# Patient Record
Sex: MALE | Race: WHITE | NOT HISPANIC OR LATINO
[De-identification: names, ages, dates, MRNs, and addresses within clinical notes are randomized per-mention and may not be internally consistent; named-entity substitution may affect disease eponyms.]

---

## 2017-03-22 ENCOUNTER — RECORDS - HEALTHEAST (OUTPATIENT)
Dept: ADMINISTRATIVE | Facility: OTHER | Age: 32
End: 2017-03-22

## 2017-09-21 ENCOUNTER — RECORDS - HEALTHEAST (OUTPATIENT)
Dept: ADMINISTRATIVE | Facility: OTHER | Age: 32
End: 2017-09-21

## 2017-09-26 ENCOUNTER — RECORDS - HEALTHEAST (OUTPATIENT)
Dept: ADMINISTRATIVE | Facility: OTHER | Age: 32
End: 2017-09-26

## 2018-01-10 ENCOUNTER — RECORDS - HEALTHEAST (OUTPATIENT)
Dept: ADMINISTRATIVE | Facility: OTHER | Age: 33
End: 2018-01-10

## 2018-01-15 ENCOUNTER — RECORDS - HEALTHEAST (OUTPATIENT)
Dept: ADMINISTRATIVE | Facility: OTHER | Age: 33
End: 2018-01-15

## 2018-04-30 ENCOUNTER — RECORDS - HEALTHEAST (OUTPATIENT)
Dept: ADMINISTRATIVE | Facility: OTHER | Age: 33
End: 2018-04-30

## 2018-05-02 ENCOUNTER — RECORDS - HEALTHEAST (OUTPATIENT)
Dept: ADMINISTRATIVE | Facility: OTHER | Age: 33
End: 2018-05-02

## 2018-05-22 ENCOUNTER — RECORDS - HEALTHEAST (OUTPATIENT)
Dept: ADMINISTRATIVE | Facility: OTHER | Age: 33
End: 2018-05-22

## 2018-05-22 ENCOUNTER — HOSPITAL ENCOUNTER (OUTPATIENT)
Dept: MRI IMAGING | Facility: CLINIC | Age: 33
Discharge: HOME | End: 2018-05-22
Attending: INTERNAL MEDICINE

## 2018-05-22 ENCOUNTER — COMMUNICATION - HEALTHEAST (OUTPATIENT)
Dept: TELEHEALTH | Facility: CLINIC | Age: 33
End: 2018-05-22

## 2018-05-22 DIAGNOSIS — K50.90 CROHN DISEASE (H): ICD-10-CM

## 2018-07-09 ENCOUNTER — RECORDS - HEALTHEAST (OUTPATIENT)
Dept: ADMINISTRATIVE | Facility: OTHER | Age: 33
End: 2018-07-09

## 2018-09-04 ENCOUNTER — RECORDS - HEALTHEAST (OUTPATIENT)
Dept: ADMINISTRATIVE | Facility: OTHER | Age: 33
End: 2018-09-04

## 2018-10-26 ENCOUNTER — RECORDS - HEALTHEAST (OUTPATIENT)
Dept: ADMINISTRATIVE | Facility: OTHER | Age: 33
End: 2018-10-26

## 2018-12-11 ENCOUNTER — AMBULATORY - HEALTHEAST (OUTPATIENT)
Dept: LAB | Facility: HOSPITAL | Age: 33
End: 2018-12-11

## 2018-12-11 ENCOUNTER — COMMUNICATION - HEALTHEAST (OUTPATIENT)
Dept: FAMILY MEDICINE | Facility: CLINIC | Age: 33
End: 2018-12-11

## 2018-12-11 ENCOUNTER — RECORDS - HEALTHEAST (OUTPATIENT)
Dept: ADMINISTRATIVE | Facility: OTHER | Age: 33
End: 2018-12-11

## 2018-12-11 DIAGNOSIS — K50.00 CROHN'S DISEASE OF SMALL INTESTINE (H): ICD-10-CM

## 2018-12-11 LAB
ALBUMIN SERPL-MCNC: 4.1 G/DL (ref 3.5–5)
ALP SERPL-CCNC: 69 U/L (ref 45–120)
ALT SERPL W P-5'-P-CCNC: 11 U/L (ref 0–45)
ANION GAP SERPL CALCULATED.3IONS-SCNC: 7 MMOL/L (ref 5–18)
AST SERPL W P-5'-P-CCNC: 12 U/L (ref 0–40)
BILIRUB SERPL-MCNC: 0.3 MG/DL (ref 0–1)
BUN SERPL-MCNC: 9 MG/DL (ref 8–22)
CALCIUM SERPL-MCNC: 10.3 MG/DL (ref 8.5–10.5)
CHLORIDE BLD-SCNC: 104 MMOL/L (ref 98–107)
CO2 SERPL-SCNC: 31 MMOL/L (ref 22–31)
CREAT SERPL-MCNC: 0.81 MG/DL (ref 0.7–1.3)
GFR SERPL CREATININE-BSD FRML MDRD: >60 ML/MIN/1.73M2
GLUCOSE BLD-MCNC: 88 MG/DL (ref 70–125)
POTASSIUM BLD-SCNC: 5.3 MMOL/L (ref 3.5–5)
PROT SERPL-MCNC: 8.3 G/DL (ref 6–8)
SODIUM SERPL-SCNC: 142 MMOL/L (ref 136–145)

## 2018-12-14 ENCOUNTER — OFFICE VISIT - HEALTHEAST (OUTPATIENT)
Dept: FAMILY MEDICINE | Facility: CLINIC | Age: 33
End: 2018-12-14

## 2018-12-14 DIAGNOSIS — E66.3 OVERWEIGHT (BMI 25.0-29.9): ICD-10-CM

## 2018-12-14 DIAGNOSIS — K50.913 CROHN'S DISEASE WITH FISTULA, UNSPECIFIED GASTROINTESTINAL TRACT LOCATION (H): ICD-10-CM

## 2018-12-14 DIAGNOSIS — Z01.818 PREOP GENERAL PHYSICAL EXAM: ICD-10-CM

## 2018-12-14 LAB
ATRIAL RATE - MUSE: 73 BPM
CHOLEST SERPL-MCNC: 134 MG/DL
DIASTOLIC BLOOD PRESSURE - MUSE: NORMAL MMHG
ERYTHROCYTE [DISTWIDTH] IN BLOOD BY AUTOMATED COUNT: 13.6 % (ref 11–14.5)
FASTING STATUS PATIENT QL REPORTED: YES
HBA1C MFR BLD: 5.2 % (ref 3.5–6)
HCT VFR BLD AUTO: 41.9 % (ref 40–54)
HDLC SERPL-MCNC: 33 MG/DL
HGB BLD-MCNC: 13.6 G/DL (ref 14–18)
INTERPRETATION ECG - MUSE: NORMAL
LDLC SERPL CALC-MCNC: 81 MG/DL
MCH RBC QN AUTO: 26.6 PG (ref 27–34)
MCHC RBC AUTO-ENTMCNC: 32.4 G/DL (ref 32–36)
MCV RBC AUTO: 82 FL (ref 80–100)
P AXIS - MUSE: 12 DEGREES
PLATELET # BLD AUTO: 348 THOU/UL (ref 140–440)
PMV BLD AUTO: 7.2 FL (ref 7–10)
POTASSIUM BLD-SCNC: 4.3 MMOL/L (ref 3.5–5)
PR INTERVAL - MUSE: 130 MS
QRS DURATION - MUSE: 98 MS
QT - MUSE: 366 MS
QTC - MUSE: 403 MS
R AXIS - MUSE: 60 DEGREES
RBC # BLD AUTO: 5.11 MILL/UL (ref 4.4–6.2)
SYSTOLIC BLOOD PRESSURE - MUSE: NORMAL MMHG
T AXIS - MUSE: 55 DEGREES
TRIGL SERPL-MCNC: 99 MG/DL
VENTRICULAR RATE- MUSE: 73 BPM
WBC: 8.2 THOU/UL (ref 4–11)

## 2018-12-17 ENCOUNTER — COMMUNICATION - HEALTHEAST (OUTPATIENT)
Dept: INTERNAL MEDICINE | Facility: CLINIC | Age: 33
End: 2018-12-17

## 2018-12-20 ENCOUNTER — ANESTHESIA - HEALTHEAST (OUTPATIENT)
Dept: SURGERY | Facility: HOSPITAL | Age: 33
End: 2018-12-20

## 2018-12-20 ASSESSMENT — MIFFLIN-ST. JEOR: SCORE: 1880.66

## 2018-12-21 ENCOUNTER — SURGERY - HEALTHEAST (OUTPATIENT)
Dept: SURGERY | Facility: HOSPITAL | Age: 33
End: 2018-12-21

## 2018-12-21 ASSESSMENT — MIFFLIN-ST. JEOR: SCORE: 1903.34

## 2018-12-24 ASSESSMENT — MIFFLIN-ST. JEOR
SCORE: 1882.93
SCORE: 1789.03

## 2018-12-25 ENCOUNTER — COMMUNICATION - HEALTHEAST (OUTPATIENT)
Dept: PHARMACY | Facility: HOSPITAL | Age: 33
End: 2018-12-25

## 2019-01-04 ENCOUNTER — RECORDS - HEALTHEAST (OUTPATIENT)
Dept: ADMINISTRATIVE | Facility: OTHER | Age: 34
End: 2019-01-04

## 2019-01-16 ENCOUNTER — COMMUNICATION - HEALTHEAST (OUTPATIENT)
Dept: FAMILY MEDICINE | Facility: CLINIC | Age: 34
End: 2019-01-16

## 2019-01-25 ENCOUNTER — RECORDS - HEALTHEAST (OUTPATIENT)
Dept: ADMINISTRATIVE | Facility: OTHER | Age: 34
End: 2019-01-25

## 2019-04-18 ENCOUNTER — RECORDS - HEALTHEAST (OUTPATIENT)
Dept: ADMINISTRATIVE | Facility: OTHER | Age: 34
End: 2019-04-18

## 2019-04-23 ENCOUNTER — RECORDS - HEALTHEAST (OUTPATIENT)
Dept: ADMINISTRATIVE | Facility: OTHER | Age: 34
End: 2019-04-23

## 2019-04-29 ENCOUNTER — COMMUNICATION - HEALTHEAST (OUTPATIENT)
Dept: FAMILY MEDICINE | Facility: CLINIC | Age: 34
End: 2019-04-29

## 2019-05-01 ENCOUNTER — OFFICE VISIT (OUTPATIENT)
Dept: PEDIATRICS | Facility: CLINIC | Age: 34
End: 2019-05-01
Payer: COMMERCIAL

## 2019-05-01 VITALS
SYSTOLIC BLOOD PRESSURE: 110 MMHG | HEIGHT: 70 IN | TEMPERATURE: 98.5 F | HEART RATE: 83 BPM | WEIGHT: 195.4 LBS | DIASTOLIC BLOOD PRESSURE: 76 MMHG | BODY MASS INDEX: 27.97 KG/M2

## 2019-05-01 DIAGNOSIS — F41.9 ANXIETY: Primary | ICD-10-CM

## 2019-05-01 DIAGNOSIS — A04.72 CLOSTRIDIUM DIFFICILE COLITIS: ICD-10-CM

## 2019-05-01 PROBLEM — K50.90 REGIONAL ENTERITIS (H): Status: ACTIVE | Noted: 2019-05-01

## 2019-05-01 PROBLEM — K50.013: Status: ACTIVE | Noted: 2018-12-21

## 2019-05-01 PROCEDURE — 99203 OFFICE O/P NEW LOW 30 MIN: CPT | Mod: GE | Performed by: STUDENT IN AN ORGANIZED HEALTH CARE EDUCATION/TRAINING PROGRAM

## 2019-05-01 RX ORDER — VANCOMYCIN HYDROCHLORIDE 125 MG/1
125 CAPSULE ORAL 4 TIMES DAILY
Refills: 0 | COMMUNITY
Start: 2019-04-18

## 2019-05-01 RX ORDER — CITALOPRAM HYDROBROMIDE 10 MG/1
TABLET ORAL
Qty: 30 TABLET | Refills: 3 | Status: SHIPPED | OUTPATIENT
Start: 2019-05-01 | End: 2019-06-06

## 2019-05-01 SDOH — HEALTH STABILITY: MENTAL HEALTH: HOW MANY STANDARD DRINKS CONTAINING ALCOHOL DO YOU HAVE ON A TYPICAL DAY?: 1 OR 2

## 2019-05-01 SDOH — HEALTH STABILITY: MENTAL HEALTH: HOW MANY DRINKS CONTAINING ALCOHOL DO YOU HAVE ON A TYPICAL DAY WHEN YOU ARE DRINKING?: 1 OR 2

## 2019-05-01 SDOH — HEALTH STABILITY: MENTAL HEALTH: HOW OFTEN DO YOU HAVE A DRINK CONTAINING ALCOHOL?: MONTHLY OR LESS

## 2019-05-01 SDOH — HEALTH STABILITY: MENTAL HEALTH: HOW OFTEN DO YOU HAVE SIX OR MORE DRINKS ON ONE OCCASION?: NEVER

## 2019-05-01 SDOH — HEALTH STABILITY: MENTAL HEALTH: HOW OFTEN DO YOU HAVE 6 OR MORE DRINKS ON ONE OCCASION?: NEVER

## 2019-05-01 ASSESSMENT — MIFFLIN-ST. JEOR: SCORE: 1837.58

## 2019-05-01 ASSESSMENT — ANXIETY QUESTIONNAIRES
1. FEELING NERVOUS, ANXIOUS, OR ON EDGE: SEVERAL DAYS
2. NOT BEING ABLE TO STOP OR CONTROL WORRYING: SEVERAL DAYS
7. FEELING AFRAID AS IF SOMETHING AWFUL MIGHT HAPPEN: NOT AT ALL
GAD7 TOTAL SCORE: 6
4. TROUBLE RELAXING: SEVERAL DAYS
5. BEING SO RESTLESS THAT IT IS HARD TO SIT STILL: SEVERAL DAYS
IF YOU CHECKED OFF ANY PROBLEMS ON THIS QUESTIONNAIRE, HOW DIFFICULT HAVE THESE PROBLEMS MADE IT FOR YOU TO DO YOUR WORK, TAKE CARE OF THINGS AT HOME, OR GET ALONG WITH OTHER PEOPLE: SOMEWHAT DIFFICULT
6. BECOMING EASILY ANNOYED OR IRRITABLE: SEVERAL DAYS
3. WORRYING TOO MUCH ABOUT DIFFERENT THINGS: SEVERAL DAYS

## 2019-05-01 ASSESSMENT — PATIENT HEALTH QUESTIONNAIRE - PHQ9: SUM OF ALL RESPONSES TO PHQ QUESTIONS 1-9: 3

## 2019-05-01 NOTE — PATIENT INSTRUCTIONS
- will start citalopram; take 1 tablet (10 mg) daily for 2 weeks then go up to 2 tablets (20 mg) daily and stay there until follow-up   - if noticing a lot of side effects and not tolerate medication, call our clinic back  - mental health provider referral made; they will call you to make an appointment      Patient Education     Treating Anxiety Disorders with Medicine  An anxiety disorder can make you feel nervous or apprehensive, even without a clear reason. In people age 65 and older, generalized anxiety disorder is one of the most commonly diagnosed anxiety disorders. Many times it occurs with depression. Certain anxiety disorders can cause intense feelings of fear or panic. You may even have physical symptoms such as a racing heartbeat, sweating, or dizziness. If you have these feelings, you don t have to suffer anymore. Treatment to help you overcome your fears will likely include therapy (also called counseling). Medicine may also be prescribed to help control your symptoms.    Medicines  Certain medicines may be prescribed to help control your symptoms. So you may feel less anxious. You may also feel able to move forward with therapy. At first, medicines and dosages may need to be adjusted to find what works best for you. Try to be patient. Tell your healthcare provider how a medicine makes you feel. This way, you can work together to find the treatment that s best for you. Keep in mind that medicines can have side effects. Talk with your provider about any side effects that are bothering you. Changing the dose or type of medicine may help. Don t stop taking medicine on your own. That can cause symptoms to come back.    Anti-anxiety medicine. This medicine eases symptoms and helps you relax. Your healthcare provider will explain when and how to use it. It may be prescribed for use before situations that make you anxious. You may also be told to take medicine on a regular schedule. Anti-anxiety medicine may  make you feel a little sleepy or  out of it.  Don t drive a car or operate machinery while on this medicine, until you know how it affects you.  Caution  Never use alcohol or other drugs with anti-anxiety medicines. This could result in loss of muscular control, sedation, coma, or death. Also, use only the amount of medicine prescribed for you. If you think you may have taken too much, get emergency care right away.     Antidepressant medicine. This kind of medicine is often used to treat anxiety, even if you aren t depressed. An antidepressant helps balance out brain chemicals. This helps keep anxiety under control. This medicine is taken on a schedule. It takes a few weeks to start working. If you don t notice a change at first, you may just need more time. But if you don t notice results after the first few weeks, tell your provider.  Keep taking medicines as prescribed  Never change your dosage, share or use another person's medicine, or stop taking your medicines without talking to your healthcare provider first. Keep the following in mind:    Some medicines must be taken on a schedule. Make this part of your daily routine. For instance, always take your pill before brushing your teeth. A pillbox can help you remember if you ve taken your medicine each day.    Medicines are often taken for 6 to 12 months. Your healthcare provider will then evaluate whether you need to stay on them. Many people who have also had therapy may no longer need medicine to manage anxiety.    You may need to stop taking medicine slowly to give your body time to adjust. When it s time to stop, your healthcare provider will tell you more. Remember: Never stop taking your medicine without talking to your provider first.    If symptoms return, you may need to start taking medicines again. This isn t your fault. It s just the nature of your anxiety disorder.  Special concerns    Side effects. Medicines may cause side effects. Ask your  healthcare provider or pharmacist what you can expect. They may have ideas for avoiding some side effects.    Sexual problems. Some antidepressants can affect your desire for sex or your ability to have an orgasm. A change in dosage or medicine often solves the problem. If you have a sexual side effect that concerns you, tell your healthcare provider.    Addiction. If you ve never had a problem with drugs or alcohol, you may not have a problem with medicines used to treat anxiety disorders. But always discuss the medicines with your healthcare provider before taking them. If you have a history of addiction, you may not be able to use certain medicines used to treat anxiety disorders.    Medicine interactions. Always check with your pharmacist before using any over-the-counter medicines, including herbal supplements.   Date Last Reviewed: 5/1/2017 2000-2018 [employer]. [address] [place] [address], [place] [address]. All rights reserved. This information is not intended as a substitute for professional medical care. Always follow your healthcare professional's instructions.

## 2019-05-01 NOTE — PROGRESS NOTES
SUBJECTIVE:   Chencho Georges is a 33 year old male who presents to clinic today for the following   health issues:      Abnormal Mood Symptoms  Onset: on going life time- never Dx  Family Hx-  Worst once child was born 2 months     Description:   Depression: no  Anxiety: YES    Accompanying Signs & Symptoms:  Still participating in activities that you used to enjoy: YES  Fatigue: YES- un born   Irritability: YES  Difficulty concentrating: YES  Changes in appetite: no   Problems with sleep: YES  Heart racing/beating fast : YES  Thoughts of hurting yourself or others: none    History:   Recent stress: YES  Prior depression hospitalization: None  Family history of depression: no   Family history of anxiety: YES    Precipitating factors:   Alcohol/drug use: YES    Alleviating factors:  Working-out     Therapies Tried and outcome: None and work out     PHQ-9 SCORE 2019   PHQ-9 Total Score 3     NICO-7 SCORE 2019   Total Score 6       Mr. Georges is a 34 yo M w/ hx of anxiety who presents to clinic for evaluation and possibly starting medication for anxiety disorder. Has been going on for long time and found out that he has a strong FH of anxiety in maternal side. His anxiety and stress level spiked since birth of his  baby 5 months ago. He also started a new job with new training as well as ileocolic resection for crohn's disease in 2018. In addition, he is getting treated for c.diff as well. He states that it is difficult to concentrate at work, retain information. Has affected his sleeping as well. He is easily agitated and gets palpitation during anxiety. Has never tried a medication or counseling in the past. Exercises help a little bit but not much.    Rare alcohol use, former smoker, no other recreational drugs. Drinks a 20 oz bottle of mountain dew per day.    No URI symptoms but still has some diarrhea due to c.diff (still has 4 week tx of PO vancomycin left). No abdominal pain, no  "hematochezia. Has a 6-mo f/u with surgeon next week for s/p resection    Additional history: as documented    Reviewed  and updated as needed this visit by clinical staff  Tobacco  Allergies  Meds  Problems  Med Hx  Surg Hx  Fam Hx  Soc Hx          Reviewed and updated as needed this visit by Provider  Tobacco  Allergies  Meds  Problems  Med Hx  Surg Hx  Fam Hx         Patient Active Problem List   Diagnosis     Crohn's disease (H)     Crohn's disease of ileum, with fistula (H)     Clostridium difficile colitis     Anxiety     Past Surgical History:   Procedure Laterality Date     RESECTION ILEOCOLIC  12/2018       Social History     Tobacco Use     Smoking status: Former Smoker     Types: Cigarettes     Smokeless tobacco: Never Used   Substance Use Topics     Alcohol use: Yes     Frequency: Monthly or less     Drinks per session: 1 or 2     Binge frequency: Never     Comment: Rare     Family History   Problem Relation Age of Onset     Anxiety Disorder Mother      No Known Problems Father      Breast Cancer Maternal Grandmother      Cancer Maternal Grandfather      No Known Problems Paternal Grandmother      No Known Problems Paternal Grandfather      No Known Problems Sister      Anxiety Disorder Maternal Aunt          Current Outpatient Medications   Medication Sig Dispense Refill     adalimumab (HUMIRA PEN-CD/UC/HS STARTER) 40 MG/0.8ML pen kit        citalopram (CELEXA) 10 MG tablet Take 1 tablet for 2 week, then 2 tablets for 2 weeks 30 tablet 3     cholecalciferol (VITAMIN D-1000 MAX ST) 1000 units TABS Take 1,000 Units by mouth       Riboflavin (VITAMIN B-2 PO)        vancomycin (VANCOCIN) 125 MG capsule Take 125 mg by mouth 4 times daily  0     No Known Allergies    ROS:  - please refer to HPI for ROS    OBJECTIVE:   /76   Pulse 83   Temp 98.5  F (36.9  C) (Oral)   Ht 1.77 m (5' 9.69\")   Wt 88.6 kg (195 lb 6.4 oz)   BMI 28.29 kg/m    Body mass index is 28.29 kg/m .     GENERAL: " healthy, alert and no distress  EYES: Eyes grossly normal to inspection, conjunctivae and sclerae normal  HENT: Nose normal, MMM, no oral lesions, non-erythematous oropharynx  NECK: Supple, full ROM, no LAD  RESP: lungs clear to auscultation - no rales, rhonchi or wheezes  CV: regular rate and rhythm, normal S1 S2, no S3 or S4, no murmur, click or rub, no peripheral edema and peripheral pulses strong  ABDOMEN: Soft, ND/NT abdomen, BS+, well healed scar noted near umbilicus  MS: no gross musculoskeletal defects noted, no edema  NEURO: Alert, normal tone and strength, mentation intact and speech normal  PSYCH: mentation appears normal, affect normal, non-depressed mood, speech is normal, no SI or HI    Diagnostic Test Results:  none     ASSESSMENT/PLAN:   1. Anxiety  Presenting with long hx of anxiety that has worsened over the past 5 months with multiple life stressors. No depressive symptoms and no SI or HI. Exam is unremarkable. With long hx of anxiety and strong FH of anxiety, would be reasonable to start pharmacotherapy as well as counseling service. Discussed this with patient and agreed on starting citalopram. Informed pt of side effects and this medication would take ~6 weeks for full effect. Already scheduled a f/u apt to recheck how medication is doing  - citalopram (CELEXA) 10 MG tablet; Take 1 tablet for 2 week, then 2 tablets for 2 weeks  Dispense: 30 tablet; Refill: 3  - MENTAL HEALTH REFERRAL  - Adult; Outpatient Treatment; Individual/Couples/Family/Group Therapy/Health Psychology; Other: [facility] [phone]; We will contact you to schedule the appointment or please call with any questi...    2. Clostridium difficile colitis  - continue PO vancomycin as prescribed      FUTURE APPOINTMENTS:       - Follow-up visit on 6/6/19 for anxiety    Patient [facility] Dr[facility] Clemente[facility]Genevieve [facility]ello [facility]  [facility]    ----------    I discussed this case in depth with   Oumou. I reviewed and agree with the key components of the history, assessment, and plan. Interested in counseling + therapy - prior family members have done well with celexa. Follow up scheduled.     LIVIER Perea MD  Internal Medicine-Pediatrics

## 2019-05-02 ASSESSMENT — ANXIETY QUESTIONNAIRES: GAD7 TOTAL SCORE: 6

## 2019-06-06 ENCOUNTER — OFFICE VISIT (OUTPATIENT)
Dept: PEDIATRICS | Facility: CLINIC | Age: 34
End: 2019-06-06
Payer: COMMERCIAL

## 2019-06-06 VITALS
RESPIRATION RATE: 16 BRPM | SYSTOLIC BLOOD PRESSURE: 122 MMHG | BODY MASS INDEX: 26.36 KG/M2 | OXYGEN SATURATION: 100 % | HEART RATE: 90 BPM | HEIGHT: 70 IN | WEIGHT: 184.1 LBS | DIASTOLIC BLOOD PRESSURE: 78 MMHG | TEMPERATURE: 98.5 F

## 2019-06-06 DIAGNOSIS — F41.9 ANXIETY: Primary | ICD-10-CM

## 2019-06-06 PROCEDURE — 99213 OFFICE O/P EST LOW 20 MIN: CPT | Mod: GE | Performed by: STUDENT IN AN ORGANIZED HEALTH CARE EDUCATION/TRAINING PROGRAM

## 2019-06-06 RX ORDER — CITALOPRAM HYDROBROMIDE 20 MG/1
20 TABLET ORAL DAILY
Qty: 90 TABLET | Refills: 0 | Status: SHIPPED | OUTPATIENT
Start: 2019-06-06 | End: 2019-12-02

## 2019-06-06 ASSESSMENT — ANXIETY QUESTIONNAIRES
6. BECOMING EASILY ANNOYED OR IRRITABLE: NOT AT ALL
5. BEING SO RESTLESS THAT IT IS HARD TO SIT STILL: NOT AT ALL
4. TROUBLE RELAXING: NOT AT ALL
IF YOU CHECKED OFF ANY PROBLEMS ON THIS QUESTIONNAIRE, HOW DIFFICULT HAVE THESE PROBLEMS MADE IT FOR YOU TO DO YOUR WORK, TAKE CARE OF THINGS AT HOME, OR GET ALONG WITH OTHER PEOPLE: SOMEWHAT DIFFICULT
GAD7 TOTAL SCORE: 2
3. WORRYING TOO MUCH ABOUT DIFFERENT THINGS: SEVERAL DAYS
1. FEELING NERVOUS, ANXIOUS, OR ON EDGE: SEVERAL DAYS
2. NOT BEING ABLE TO STOP OR CONTROL WORRYING: NOT AT ALL
7. FEELING AFRAID AS IF SOMETHING AWFUL MIGHT HAPPEN: NOT AT ALL

## 2019-06-06 ASSESSMENT — MIFFLIN-ST. JEOR: SCORE: 1783.38

## 2019-06-06 ASSESSMENT — PATIENT HEALTH QUESTIONNAIRE - PHQ9: SUM OF ALL RESPONSES TO PHQ QUESTIONS 1-9: 3

## 2019-06-06 NOTE — PATIENT INSTRUCTIONS
- continue taking the rest of the medication you have from last visit (2 tablets = 20 mg from last bottle); after you are done with the bottle, the refill medication is 1 tablet = 20 mg so take 1 tablet daily with that  - if you start noticing more side effects, please call clinic

## 2019-06-06 NOTE — PROGRESS NOTES
Subjective     Chencho Georges is a 33 year old male who presents to clinic today for the following health issues:    HPI    Anxiety Follow-Up    How are you doing with your anxiety since your last visit? States his wife has noticed it has improved     Are you having other symptoms that might be associated with anxiety? No    Have you had a significant life event? No     Are you feeling depressed? No    Do you have any concerns with your use of alcohol or other drugs? No    Social History     Tobacco Use     Smoking status: Former Smoker     Types: Cigarettes     Smokeless tobacco: Never Used   Substance Use Topics     Alcohol use: Yes     Frequency: Monthly or less     Drinks per session: 1 or 2     Binge frequency: Never     Comment: Rare     Drug use: Never     NICO-7 SCORE 5/1/2019 6/6/2019   Total Score 6 2     PHQ 5/1/2019 6/6/2019   PHQ-9 Total Score 3 3   Q9: Thoughts of better off dead/self-harm past 2 weeks Not at all Not at all       Amount of exercise or physical activity: None-Chron's has been acting up     Problems taking medications regularly: No    Medication side effects: none    Diet: regular (no restrictions)    Chencho is a 34 yo M w/ hx of Crohn's disease s/p ileocolic resection c/b c. Diff colitis and anxiety who presents to clinic for anxiety follow-up. At last visit about 1 month ago, he was started on celexa. Since then, he has been doing well with celexa and has noticed improvement. He has noticed personally that he is sleeping better, concentrating easier, and overall happier. First 3 days after starting celexa, he had some nausea but resolved spontaneously. At last visit, he was just starting a new job with lots of job training but he is now finished with that and his new job is going well. His wife has also noticed same improvement. He still has days where he is more anxious and hard to concentrate but it is definitely better. No panic attack episodes since staring celexa. HE still has loose  stool. He is finished with a course of vancomycin for c.diff and will be seeing a GI specialist tomorrow. No new stressors at work or home. No big events coming up.    Patient Active Problem List   Diagnosis     Crohn's disease (H)     Crohn's disease of ileum, with fistula (H)     Clostridium difficile colitis     Anxiety     Past Surgical History:   Procedure Laterality Date     RESECTION ILEOCOLIC  12/2018       Social History     Tobacco Use     Smoking status: Former Smoker     Types: Cigarettes     Smokeless tobacco: Never Used   Substance Use Topics     Alcohol use: Yes     Frequency: Monthly or less     Drinks per session: 1 or 2     Binge frequency: Never     Comment: Rare     Family History   Problem Relation Age of Onset     Anxiety Disorder Mother      No Known Problems Father      Breast Cancer Maternal Grandmother      Cancer Maternal Grandfather      No Known Problems Paternal Grandmother      No Known Problems Paternal Grandfather      No Known Problems Sister      Anxiety Disorder Maternal Aunt          Current Outpatient Medications   Medication Sig Dispense Refill     adalimumab (HUMIRA PEN-CD/UC/HS STARTER) 40 MG/0.8ML pen kit        cholecalciferol (VITAMIN D-1000 MAX ST) 1000 units TABS Take 1,000 Units by mouth       citalopram (CELEXA) 20 MG tablet Take 1 tablet (20 mg) by mouth daily Take 1 tablet for 2 week, then 2 tablets for 2 weeks 90 tablet 0     Riboflavin (VITAMIN B-2 PO)        vancomycin (VANCOCIN) 125 MG capsule Take 125 mg by mouth 4 times daily  0     No Known Allergies    Reviewed and updated as needed this visit by Provider  Tobacco  Allergies  Meds  Problems  Med Hx  Surg Hx  Fam Hx         Review of Systems   CONSTITUTIONAL: NEGATIVE for fever, chills, change in weight  GI: POSITIVE for loose stool; NEGATIVE for nausea, abdominal pain  PSYCHIATRIC: NEGATIVE for changes in mood or affect      Objective    /78 (BP Location: Right arm, Patient Position: Chair, Cuff  "Size: Adult Regular)   Pulse 90   Temp 98.5  F (36.9  C) (Oral)   Resp 16   Ht 1.765 m (5' 9.5\")   Wt 83.5 kg (184 lb 1.6 oz)   SpO2 100%   BMI 26.80 kg/m    Body mass index is 26.8 kg/m .  Physical Exam   GENERAL: healthy, alert and no distress  HEENT: NC/AT, normal conjunctivae, anicteric sclerae, MMM  RESP: lungs clear to auscultation - no rales, rhonchi or wheezes  CV: regular rate and rhythm, normal S1 S2, no S3 or S4, no murmur, click or rub, no peripheral edema and peripheral pulses strong  NEURO: Alert, CN grossly intact, normal strength and tone, mentation intact and speech normal  PSYCH: mood is improved, affect normal    Diagnostic Test Results:  Labs reviewed in Epic        Assessment & Plan     1. Anxiety  Presenting for a 1 month follow-up. Was instructed to start with 10 mg and advance to 20 mg; however, he stayed at 10 mg and still noticed some improvement. He would like to go up on the dose to see if  It improves his remaining symptoms. Otherwise doing well with the medication. Has decided not to see a counselor yet and let him know to contact clinic if he needs another referral  - citalopram (CELEXA) 20 MG tablet; Take 1 tablet (20 mg) by mouth daily Take 1 tablet for 2 week, then 2 tablets for 2 weeks  Dispense: 90 tablet; Refill: 0     BMI:   Estimated body mass index is 26.8 kg/m  as calculated from the following:    Height as of this encounter: 1.765 m (5' 9.5\").    Weight as of this encounter: 83.5 kg (184 lb 1.6 oz).   Weight management plan: Discussed healthy diet and exercise guidelines        FUTURE APPOINTMENTS:  Return in about 3 months (around 9/6/2019) for Follow-up.    Patient discussed with Dr. Nay Coello MD  [facility]    ===========  STAFF NOTE:  Patient discussed with resident physician today.  We discussed martin portions of the visit and I participated in the evaluation and management of the patient today.     Gt Tee MD       "

## 2019-06-07 ENCOUNTER — RECORDS - HEALTHEAST (OUTPATIENT)
Dept: ADMINISTRATIVE | Facility: OTHER | Age: 34
End: 2019-06-07

## 2019-06-07 ASSESSMENT — ANXIETY QUESTIONNAIRES: GAD7 TOTAL SCORE: 2

## 2019-06-18 ENCOUNTER — COMMUNICATION - HEALTHEAST (OUTPATIENT)
Dept: FAMILY MEDICINE | Facility: CLINIC | Age: 34
End: 2019-06-18

## 2019-08-08 ASSESSMENT — MIFFLIN-ST. JEOR: SCORE: 1764.99

## 2019-08-09 ENCOUNTER — SURGERY - HEALTHEAST (OUTPATIENT)
Dept: GASTROENTEROLOGY | Facility: CLINIC | Age: 34
End: 2019-08-09

## 2019-08-09 ASSESSMENT — MIFFLIN-ST. JEOR: SCORE: 1773.16

## 2019-08-10 ASSESSMENT — MIFFLIN-ST. JEOR: SCORE: 1771.8

## 2019-08-11 ASSESSMENT — MIFFLIN-ST. JEOR: SCORE: 1771.8

## 2019-08-12 ASSESSMENT — MIFFLIN-ST. JEOR: SCORE: 1773.61

## 2019-08-13 ASSESSMENT — MIFFLIN-ST. JEOR: SCORE: 1771.8

## 2019-08-16 ENCOUNTER — COMMUNICATION - HEALTHEAST (OUTPATIENT)
Dept: CARE COORDINATION | Facility: CLINIC | Age: 34
End: 2019-08-16

## 2019-08-17 ENCOUNTER — COMMUNICATION - HEALTHEAST (OUTPATIENT)
Dept: SCHEDULING | Facility: CLINIC | Age: 34
End: 2019-08-17

## 2019-08-17 DIAGNOSIS — K52.9 COLITIS: ICD-10-CM

## 2019-08-19 ENCOUNTER — AMBULATORY - HEALTHEAST (OUTPATIENT)
Dept: PHARMACY | Facility: CLINIC | Age: 34
End: 2019-08-19

## 2019-08-19 ENCOUNTER — COMMUNICATION - HEALTHEAST (OUTPATIENT)
Dept: PHARMACY | Facility: CLINIC | Age: 34
End: 2019-08-19

## 2019-08-19 DIAGNOSIS — K52.9 COLITIS: ICD-10-CM

## 2019-08-19 DIAGNOSIS — A04.72 CLOSTRIDIUM DIFFICILE COLITIS: ICD-10-CM

## 2019-08-19 DIAGNOSIS — E56.9 VITAMIN DEFICIENCY: ICD-10-CM

## 2019-08-19 DIAGNOSIS — F41.9 ANXIETY: ICD-10-CM

## 2019-08-22 ENCOUNTER — COMMUNICATION - HEALTHEAST (OUTPATIENT)
Dept: TELEHEALTH | Facility: CLINIC | Age: 34
End: 2019-08-22

## 2019-08-22 ENCOUNTER — OFFICE VISIT - HEALTHEAST (OUTPATIENT)
Dept: FAMILY MEDICINE | Facility: CLINIC | Age: 34
End: 2019-08-22

## 2019-08-22 ENCOUNTER — RECORDS - HEALTHEAST (OUTPATIENT)
Dept: GENERAL RADIOLOGY | Facility: CLINIC | Age: 34
End: 2019-08-22

## 2019-08-22 DIAGNOSIS — R06.09 OTHER FORMS OF DYSPNEA: ICD-10-CM

## 2019-08-22 DIAGNOSIS — R00.0 TACHYCARDIA: ICD-10-CM

## 2019-08-22 DIAGNOSIS — K50.013: ICD-10-CM

## 2019-08-22 DIAGNOSIS — R65.10 SIRS (SYSTEMIC INFLAMMATORY RESPONSE SYNDROME) (H): ICD-10-CM

## 2019-08-22 DIAGNOSIS — R06.09 DYSPNEA ON EXERTION: ICD-10-CM

## 2019-08-22 DIAGNOSIS — A04.72 CLOSTRIDIUM DIFFICILE COLITIS: ICD-10-CM

## 2019-08-22 DIAGNOSIS — K14.3 COATED TONGUE: ICD-10-CM

## 2019-08-22 DIAGNOSIS — M62.81 MUSCLE WEAKNESS (GENERALIZED): ICD-10-CM

## 2019-08-22 LAB
ALBUMIN SERPL-MCNC: 3.5 G/DL (ref 3.5–5)
ALP SERPL-CCNC: 79 U/L (ref 45–120)
ALT SERPL W P-5'-P-CCNC: 11 U/L (ref 0–45)
ANION GAP SERPL CALCULATED.3IONS-SCNC: 15 MMOL/L (ref 5–18)
AST SERPL W P-5'-P-CCNC: 8 U/L (ref 0–40)
ATRIAL RATE - MUSE: 104 BPM
BILIRUB SERPL-MCNC: 0.4 MG/DL (ref 0–1)
BUN SERPL-MCNC: 10 MG/DL (ref 8–22)
CALCIUM SERPL-MCNC: 9.8 MG/DL (ref 8.5–10.5)
CHLORIDE BLD-SCNC: 94 MMOL/L (ref 98–107)
CO2 SERPL-SCNC: 31 MMOL/L (ref 22–31)
CREAT SERPL-MCNC: 0.82 MG/DL (ref 0.7–1.3)
D DIMER PPP FEU-MCNC: 0.88 FEU UG/ML
DIASTOLIC BLOOD PRESSURE - MUSE: NORMAL MMHG
GFR SERPL CREATININE-BSD FRML MDRD: >60 ML/MIN/1.73M2
GLUCOSE BLD-MCNC: 102 MG/DL (ref 70–125)
INTERPRETATION ECG - MUSE: NORMAL
P AXIS - MUSE: 49 DEGREES
POTASSIUM BLD-SCNC: 4.7 MMOL/L (ref 3.5–5)
PR INTERVAL - MUSE: 116 MS
PROCALCITONIN SERPL-MCNC: 0.07 NG/ML (ref 0–0.49)
PROT SERPL-MCNC: 7.4 G/DL (ref 6–8)
QRS DURATION - MUSE: 98 MS
QT - MUSE: 316 MS
QTC - MUSE: 415 MS
R AXIS - MUSE: 75 DEGREES
SODIUM SERPL-SCNC: 140 MMOL/L (ref 136–145)
SYSTOLIC BLOOD PRESSURE - MUSE: NORMAL MMHG
T AXIS - MUSE: -16 DEGREES
VENTRICULAR RATE- MUSE: 104 BPM

## 2019-08-22 ASSESSMENT — MIFFLIN-ST. JEOR: SCORE: 1672.01

## 2019-08-23 ENCOUNTER — COMMUNICATION - HEALTHEAST (OUTPATIENT)
Dept: FAMILY MEDICINE | Facility: CLINIC | Age: 34
End: 2019-08-23

## 2019-08-23 LAB
BASOPHILS # BLD AUTO: 0 THOU/UL (ref 0–0.2)
BASOPHILS NFR BLD AUTO: 0 % (ref 0–2)
EOSINOPHIL # BLD AUTO: 0 THOU/UL (ref 0–0.4)
EOSINOPHIL NFR BLD AUTO: 0 % (ref 0–6)
ERYTHROCYTE [DISTWIDTH] IN BLOOD BY AUTOMATED COUNT: 14.4 % (ref 11–14.5)
HCT VFR BLD AUTO: 44.4 % (ref 40–54)
HGB BLD-MCNC: 13.7 G/DL (ref 14–18)
LAB AP CHARGES (HE HISTORICAL CONVERSION): NORMAL
LYMPHOCYTES # BLD AUTO: 1.1 THOU/UL (ref 0.8–4.4)
LYMPHOCYTES NFR BLD AUTO: 7 % (ref 20–40)
MCH RBC QN AUTO: 25.7 PG (ref 27–34)
MCHC RBC AUTO-ENTMCNC: 30.9 G/DL (ref 32–36)
MCV RBC AUTO: 83 FL (ref 80–100)
MONOCYTES # BLD AUTO: 1.3 THOU/UL (ref 0–0.9)
MONOCYTES NFR BLD AUTO: 8 % (ref 2–10)
NEUTROPHILS # BLD AUTO: 12.9 THOU/UL (ref 2–7.7)
NEUTROPHILS NFR BLD AUTO: 84 % (ref 50–70)
PATH REPORT.COMMENTS IMP SPEC: NORMAL
PATH REPORT.COMMENTS IMP SPEC: NORMAL
PATH REPORT.FINAL DX SPEC: NORMAL
PATH REPORT.MICROSCOPIC SPEC OTHER STN: ABNORMAL
PATH REPORT.RELEVANT HX SPEC: NORMAL
PLATELET # BLD AUTO: 507 THOU/UL (ref 140–440)
PMV BLD AUTO: 9.9 FL (ref 8.5–12.5)
RBC # BLD AUTO: 5.33 MILL/UL (ref 4.4–6.2)
WBC: 15.3 THOU/UL (ref 4–11)

## 2019-08-26 ENCOUNTER — AMBULATORY - HEALTHEAST (OUTPATIENT)
Dept: LAB | Facility: CLINIC | Age: 34
End: 2019-08-26

## 2019-08-26 ENCOUNTER — RECORDS - HEALTHEAST (OUTPATIENT)
Dept: ADMINISTRATIVE | Facility: OTHER | Age: 34
End: 2019-08-26

## 2019-08-26 DIAGNOSIS — A04.72 CLOSTRIDIUM DIFFICILE COLITIS: ICD-10-CM

## 2019-08-26 DIAGNOSIS — K50.013: ICD-10-CM

## 2019-08-26 LAB
C DIFF TOX B STL QL: NEGATIVE
RIBOTYPE 027/NAP1/BI: NORMAL

## 2019-08-28 LAB
MAGNESIUM,RBC - HISTORICAL: 7 MG/DL (ref 3.5–7.1)
SPECIMEN STATUS: NORMAL

## 2019-08-29 ENCOUNTER — RECORDS - HEALTHEAST (OUTPATIENT)
Dept: ADMINISTRATIVE | Facility: OTHER | Age: 34
End: 2019-08-29

## 2019-08-29 ENCOUNTER — COMMUNICATION - HEALTHEAST (OUTPATIENT)
Dept: FAMILY MEDICINE | Facility: CLINIC | Age: 34
End: 2019-08-29

## 2019-09-18 ENCOUNTER — RECORDS - HEALTHEAST (OUTPATIENT)
Dept: ADMINISTRATIVE | Facility: OTHER | Age: 34
End: 2019-09-18

## 2019-09-19 ENCOUNTER — COMMUNICATION - HEALTHEAST (OUTPATIENT)
Dept: FAMILY MEDICINE | Facility: CLINIC | Age: 34
End: 2019-09-19

## 2019-09-24 ENCOUNTER — COMMUNICATION - HEALTHEAST (OUTPATIENT)
Dept: FAMILY MEDICINE | Facility: CLINIC | Age: 34
End: 2019-09-24

## 2019-09-24 ENCOUNTER — OFFICE VISIT - HEALTHEAST (OUTPATIENT)
Dept: FAMILY MEDICINE | Facility: CLINIC | Age: 34
End: 2019-09-24

## 2019-09-24 DIAGNOSIS — A04.72 CLOSTRIDIUM DIFFICILE COLITIS: ICD-10-CM

## 2019-09-24 DIAGNOSIS — D84.9 IMMUNOSUPPRESSION (H): ICD-10-CM

## 2019-09-24 DIAGNOSIS — K50.013: ICD-10-CM

## 2019-09-24 DIAGNOSIS — R79.89 LOW VITAMIN B12 LEVEL: ICD-10-CM

## 2019-09-24 DIAGNOSIS — K50.913 CROHN'S DISEASE WITH FISTULA, UNSPECIFIED GASTROINTESTINAL TRACT LOCATION (H): ICD-10-CM

## 2019-09-24 DIAGNOSIS — K52.9 COLITIS: ICD-10-CM

## 2019-10-15 ENCOUNTER — RECORDS - HEALTHEAST (OUTPATIENT)
Dept: ADMINISTRATIVE | Facility: OTHER | Age: 34
End: 2019-10-15

## 2019-11-01 ENCOUNTER — RECORDS - HEALTHEAST (OUTPATIENT)
Dept: ADMINISTRATIVE | Facility: OTHER | Age: 34
End: 2019-11-01

## 2019-11-05 ENCOUNTER — RECORDS - HEALTHEAST (OUTPATIENT)
Dept: ADMINISTRATIVE | Facility: OTHER | Age: 34
End: 2019-11-05

## 2019-11-18 ENCOUNTER — COMMUNICATION - HEALTHEAST (OUTPATIENT)
Dept: FAMILY MEDICINE | Facility: CLINIC | Age: 34
End: 2019-11-18

## 2019-11-20 ENCOUNTER — COMMUNICATION - HEALTHEAST (OUTPATIENT)
Dept: FAMILY MEDICINE | Facility: CLINIC | Age: 34
End: 2019-11-20

## 2019-11-27 ENCOUNTER — COMMUNICATION - HEALTHEAST (OUTPATIENT)
Dept: FAMILY MEDICINE | Facility: CLINIC | Age: 34
End: 2019-11-27

## 2019-12-02 DIAGNOSIS — F41.9 ANXIETY: ICD-10-CM

## 2019-12-02 RX ORDER — CITALOPRAM HYDROBROMIDE 20 MG/1
20 TABLET ORAL DAILY
Qty: 90 TABLET | Refills: 1 | Status: SHIPPED | OUTPATIENT
Start: 2019-12-02

## 2019-12-02 NOTE — TELEPHONE ENCOUNTER
Routing refill request to provider for review/approval because:  A break in medication  Mahsa Chu RN

## 2019-12-05 ENCOUNTER — COMMUNICATION - HEALTHEAST (OUTPATIENT)
Dept: FAMILY MEDICINE | Facility: CLINIC | Age: 34
End: 2019-12-05

## 2020-01-21 ENCOUNTER — RECORDS - HEALTHEAST (OUTPATIENT)
Dept: ADMINISTRATIVE | Facility: OTHER | Age: 35
End: 2020-01-21

## 2020-02-13 ENCOUNTER — RECORDS - HEALTHEAST (OUTPATIENT)
Dept: ADMINISTRATIVE | Facility: OTHER | Age: 35
End: 2020-02-13

## 2020-02-21 ENCOUNTER — RECORDS - HEALTHEAST (OUTPATIENT)
Dept: ADMINISTRATIVE | Facility: OTHER | Age: 35
End: 2020-02-21

## 2020-03-05 ENCOUNTER — RECORDS - HEALTHEAST (OUTPATIENT)
Dept: ADMINISTRATIVE | Facility: OTHER | Age: 35
End: 2020-03-05

## 2020-03-12 ENCOUNTER — RECORDS - HEALTHEAST (OUTPATIENT)
Dept: ADMINISTRATIVE | Facility: OTHER | Age: 35
End: 2020-03-12

## 2020-04-08 ENCOUNTER — RECORDS - HEALTHEAST (OUTPATIENT)
Dept: ADMINISTRATIVE | Facility: OTHER | Age: 35
End: 2020-04-08

## 2020-06-12 ENCOUNTER — RECORDS - HEALTHEAST (OUTPATIENT)
Dept: BONE DENSITY | Facility: CLINIC | Age: 35
End: 2020-06-12

## 2020-06-12 ENCOUNTER — RECORDS - HEALTHEAST (OUTPATIENT)
Dept: ADMINISTRATIVE | Facility: OTHER | Age: 35
End: 2020-06-12

## 2020-06-12 DIAGNOSIS — K50.80 CROHN'S DISEASE OF BOTH SMALL AND LARGE INTESTINE WITHOUT COMPLICATIONS (H): ICD-10-CM

## 2020-08-31 ENCOUNTER — RECORDS - HEALTHEAST (OUTPATIENT)
Dept: ADMINISTRATIVE | Facility: OTHER | Age: 35
End: 2020-08-31

## 2020-11-23 ENCOUNTER — COMMUNICATION - HEALTHEAST (OUTPATIENT)
Dept: FAMILY MEDICINE | Facility: CLINIC | Age: 35
End: 2020-11-23

## 2020-11-25 ENCOUNTER — COMMUNICATION - HEALTHEAST (OUTPATIENT)
Dept: FAMILY MEDICINE | Facility: CLINIC | Age: 35
End: 2020-11-25

## 2020-12-10 LAB
ALT SERPL W/O P-5'-P-CCNC: 24 U/L (ref 0–78)
AST SERPL-CCNC: 15 U/L (ref 0–37)

## 2020-12-23 ENCOUNTER — RECORDS - HEALTHEAST (OUTPATIENT)
Dept: ADMINISTRATIVE | Facility: OTHER | Age: 35
End: 2020-12-23

## 2021-01-04 ENCOUNTER — RECORDS - HEALTHEAST (OUTPATIENT)
Dept: HEALTH INFORMATION MANAGEMENT | Facility: CLINIC | Age: 36
End: 2021-01-04

## 2021-01-11 ENCOUNTER — COMMUNICATION - HEALTHEAST (OUTPATIENT)
Dept: SCHEDULING | Facility: CLINIC | Age: 36
End: 2021-01-11

## 2021-01-11 ENCOUNTER — COMMUNICATION - HEALTHEAST (OUTPATIENT)
Dept: FAMILY MEDICINE | Facility: CLINIC | Age: 36
End: 2021-01-11

## 2021-01-12 ENCOUNTER — OFFICE VISIT - HEALTHEAST (OUTPATIENT)
Dept: FAMILY MEDICINE | Facility: CLINIC | Age: 36
End: 2021-01-12

## 2021-01-12 ENCOUNTER — AMBULATORY - HEALTHEAST (OUTPATIENT)
Dept: FAMILY MEDICINE | Facility: CLINIC | Age: 36
End: 2021-01-12

## 2021-01-12 DIAGNOSIS — R05.9 COUGH: ICD-10-CM

## 2021-01-12 DIAGNOSIS — Z20.822 EXPOSURE TO COVID-19 VIRUS: ICD-10-CM

## 2021-01-13 ENCOUNTER — COMMUNICATION - HEALTHEAST (OUTPATIENT)
Dept: SCHEDULING | Facility: CLINIC | Age: 36
End: 2021-01-13

## 2021-01-13 ENCOUNTER — COMMUNICATION - HEALTHEAST (OUTPATIENT)
Dept: FAMILY MEDICINE | Facility: CLINIC | Age: 36
End: 2021-01-13

## 2021-01-14 ENCOUNTER — COMMUNICATION - HEALTHEAST (OUTPATIENT)
Dept: SCHEDULING | Facility: CLINIC | Age: 36
End: 2021-01-14

## 2021-01-15 ENCOUNTER — COMMUNICATION - HEALTHEAST (OUTPATIENT)
Dept: FAMILY MEDICINE | Facility: CLINIC | Age: 36
End: 2021-01-15

## 2021-01-26 ENCOUNTER — COMMUNICATION - HEALTHEAST (OUTPATIENT)
Dept: FAMILY MEDICINE | Facility: CLINIC | Age: 36
End: 2021-01-26

## 2021-01-28 ENCOUNTER — COMMUNICATION - HEALTHEAST (OUTPATIENT)
Dept: FAMILY MEDICINE | Facility: CLINIC | Age: 36
End: 2021-01-28

## 2021-02-01 ENCOUNTER — COMMUNICATION - HEALTHEAST (OUTPATIENT)
Dept: FAMILY MEDICINE | Facility: CLINIC | Age: 36
End: 2021-02-01

## 2021-02-17 ENCOUNTER — AMBULATORY - HEALTHEAST (OUTPATIENT)
Dept: PHARMACY | Facility: HOSPITAL | Age: 36
End: 2021-02-17

## 2021-02-25 ENCOUNTER — COMMUNICATION - HEALTHEAST (OUTPATIENT)
Dept: FAMILY MEDICINE | Facility: CLINIC | Age: 36
End: 2021-02-25

## 2021-02-25 ENCOUNTER — COMMUNICATION - HEALTHEAST (OUTPATIENT)
Dept: INTERNAL MEDICINE | Facility: CLINIC | Age: 36
End: 2021-02-25

## 2021-03-23 ENCOUNTER — COMMUNICATION - HEALTHEAST (OUTPATIENT)
Dept: FAMILY MEDICINE | Facility: CLINIC | Age: 36
End: 2021-03-23

## 2021-03-27 ENCOUNTER — AMBULATORY - HEALTHEAST (OUTPATIENT)
Dept: NURSING | Facility: CLINIC | Age: 36
End: 2021-03-27

## 2021-04-17 ENCOUNTER — AMBULATORY - HEALTHEAST (OUTPATIENT)
Dept: NURSING | Facility: CLINIC | Age: 36
End: 2021-04-17

## 2021-05-05 ENCOUNTER — COMMUNICATION - HEALTHEAST (OUTPATIENT)
Dept: FAMILY MEDICINE | Facility: CLINIC | Age: 36
End: 2021-05-05

## 2021-05-05 DIAGNOSIS — F43.25 ADJUSTMENT REACTION WITH MIXED DISTURBANCE OF EMOTIONS AND CONDUCT: ICD-10-CM

## 2021-05-28 NOTE — TELEPHONE ENCOUNTER
----- Message from Sergei Oliver MD sent at 4/25/2019  6:57 PM CDT -----  Call Chencho and ask him to do Garden [employer]    GARDEN [employer] LIFE RAW PROBIOT[employer] WOMEN 90 VEGE CAPS    As directed to support C Diff Treatment  Also GTS Kombucha 8 oz Daily     DAnL

## 2021-05-28 NOTE — TELEPHONE ENCOUNTER
Left message to call back for: Regarding results  Information to relay to patient:  LMTCB please relay providers message below to the pt when he calls back.  Thank you

## 2021-05-28 NOTE — TELEPHONE ENCOUNTER
Patient Returning Call  Reason for call:  Message from clinic  Information relayed to patient:   Message from Sergei Oliver MD sent at 4/25/2019  6:57 PM CDT -----  Call Chencho and ask him to do [employer]     G[employer] RAW PRO[employer] WOMEN 90 VEGE CAPS     As directed to support C Diff Treatment  Also GTS Kombucha 8 oz Daily      DAnL  Patient has additional questions:  No  If YES, what are your questions/concerns:  n/a  Okay to leave a detailed message?: No call back needed

## 2021-05-31 NOTE — PROGRESS NOTES
"TCM DISCHARGE FOLLOW UP CALL    Discharge Date:  8/14/2019  Reason for hospital stay (discharge diagnosis)::  Colitis  Are you feeling better, the same or worse since your discharge?:  Patient is feeling better (Is having 1-5 small \"liquid-y\" stools daily. stools brown, no blood. Not passing flatus. Denies bloating. Still has abd cramps but not as intense.)  Do you feel like you have a plan in the event of a health emergency?: Yes (mother)    As part of your discharge plan, were  home care services ordered for you?: No    Did you receive any new medications, or was there a change to your medications?: Yes    Are you taking those medications, or do you have any established regiment?:  Pt's meds were reviewed during phone MTM visit. Meds not reviewed this call.  Do you have any follow up visits scheduled with your PCP or Specialist?:  Yes, with PCP and Yes, with Specialist  (RN) Is PCP appt scheduled soon enough (within 14 days of discharge date)?: Yes (8/22)    Who are you seeing and when is it scheduled?:  8/26    "

## 2021-05-31 NOTE — TELEPHONE ENCOUNTER
Controlled Substance Refill Request  Medication:   Requested Prescriptions     Pending Prescriptions Disp Refills     oxyCODONE (ROXICODONE) 5 MG immediate release tablet 13 tablet 0     Sig: Take 1 tablet (5 mg total) by mouth every 4 (four) hours as needed.     Signed Prescriptions Disp Refills     dicyclomine (BENTYL) 10 MG capsule 12 capsule 0     Sig: Take 1 capsule (10 mg total) by mouth 4 (four) times a day as needed (Abdominal cramps).     Authorizing Provider: JALEN TELLES     Ordering User: RADHA KENNEDY     Date Last Fill: 8/14/19  [id number] [place] [address] [address]   Submit electronically to pharmacy  Controlled Substance Agreement on File:   Encounter-Level CSA Scan Date:    There are no encounter-level csa scan date.       Last office visit: Last office visit pertaining to requested medication was 8/14/19.  Radha Kennedy RN, MA  [facility] RN Triage Nurse Advisor         Dr Brower

## 2021-05-31 NOTE — PROGRESS NOTES
Hospital discharge follow up call to pt, no answer.  Left VM message reminding pt of appt on 8/22. RN will attempt call back at another time.

## 2021-05-31 NOTE — PROGRESS NOTES
Preoperative Exam    Scheduled Procedure: Fecal transplant  Surgery Date:  9/6/19  Surgery Location: [facility]    Surgeon: Siomara Hernandez     Assessment/Plan:     Problem List Items Addressed This Visit     Tachycardia    Relevant Orders    D-dimer, Quantitative (Completed)    Morphology, Path Smear Review (MORP) (Completed)    Comprehensive Metabolic Panel (Completed)    Magnesium, Red Blood Cell    Electrocardiogram Perform and Read (Completed)    Procalcitonin (Completed)    Peripheral Blood Smear, Path Review (Completed)    SIRS (systemic inflammatory response syndrome) (H)    Relevant Orders    Procalcitonin (Completed)    Blood culture from PERIPHERAL SITE    Muscle weakness (generalized)    Crohn's disease of ileum, with fistula (H)    Relevant Orders    D-dimer, Quantitative (Completed)    Morphology, Path Smear Review (MORP) (Completed)    Comprehensive Metabolic Panel (Completed)    Magnesium, Red Blood Cell    Electrocardiogram Perform and Read (Completed)    Procalcitonin (Completed)    Blood culture from PERIPHERAL SITE    C. difficile Toxigenic by PCR    Peripheral Blood Smear, Path Review (Completed)    Clostridium difficile colitis    Relevant Orders    C. difficile Toxigenic by PCR      Other Visit Diagnoses     Dyspnea on exertion    -  Primary    Relevant Orders    D-dimer, Quantitative (Completed)    Morphology, Path Smear Review (MORP) (Completed)    Comprehensive Metabolic Panel (Completed)    Magnesium, Red Blood Cell    Electrocardiogram Perform and Read (Completed)    Procalcitonin (Completed)    XR Chest 2 Views (Completed)    Peripheral Blood Smear, Path Review (Completed)    Coated tongue        Relevant Medications    fluconazole (DIFLUCAN) 150 MG tablet        Discussed with Kristina today his ongoing problems  Likely has thrush treat with Diflucan  He is currently evaluated for a stool transplant and may proceed if he has remission of his Crohn's disease  Encouraged cultured foods,  Kombucha (specifically is GTS brand as this is with Saccharomyces Boulardii), probiotics    Biggest concern is immune suppression currently on prednisone as well as Humira but risk-benefit stool transplant may provide a big boost for his immune system as he likely has a disorder.immune [email or web address]      Discussed with him his mild elevation in d-dimer  His saturations were normal  He had had a PE run in December  When I called him in the evening he was feeling better  We discussed the risk benefits of pursuing CT scan he wishes to hold off at the present time I believe this is reasonable  His d-dimer may be elevated due to his Crohn's disease    He has worsening symptoms he will proceed to the emergency room specifically to evaluate and rule out pulmonary embolus as a cause of his dyspnea      Surgical Procedure Risk: Low (reported cardiac risk generally < 1%)  Have you had prior anesthesia?: Yes  Have you or any family members had a previous anesthesia reaction:  No  Do you or any family members have a history of a clotting or bleeding disorder?: No  Cardiac Risk Assessment: no increased risk for major cardiac complications    APPROVAL GIVEN to proceed with proposed procedure, without further diagnostic evaluation    Please Note:  None    Functional Status: Independent  Patient plans to recover at home with family.     Subjective:      Chencho Georges is a 33 y.o. male who presents for a preoperative consultation.     Known history of Crohn's disease recent exacerbations  Also unfortunately history of C. difficile colitis  Stool studies have been equivocal  Biopsy showed active colitis  Poor appetite  Poor energy  Loss of weight  Depression  Here today with his mother    Plan is to see [facility] currently on a prednisone taper    Feeling a little bit better today    Was complaining of some dyspnea  Had a work-up for pulmonary embolus back in December  CT scan was negative  He is unclear whether this is related  to Crohn's exacerbation or something else    Restarting Humira      All other systems reviewed and are negative, other than those listed in the HPI.    Pertinent History  Do you have difficulty breathing or chest pain after walking up a flight of stairs: Yes: yes  History of obstructive sleep apnea: No  Steroid use in the last 6 months: Yes: Predisone    40 mg daily >> 30 mg >> 20 mg >> 10 mg   Frequent Aspirin/NSAID use: No  Prior Blood Transfusion: No  Prior Blood Transfusion Reaction: No  If for some reason prior to, during or after the procedure, if it is medically indicated, would you be willing to have a blood transfusion?:  There is no transfusion refusal.    Current Outpatient Medications   Medication Sig Dispense Refill     adalimumab (HUMIRA) 40 mg/0.8 mL injection Inject 40 mg under the skin every 7 days. On Sundays       cholecalciferol, vitamin D3, 5,000 unit Tab Take 1 tablet (5,000 Units total) by mouth daily.  0     cholestyramine-aspartame, sugar free, (CHOLESTYRAMINE LIGHT) 4 gram PwPk Take 2 packets by mouth 2 (two) times a day.       citalopram (CELEXA) 20 MG tablet Take 20 mg by mouth every evening.              dicyclomine (BENTYL) 10 MG capsule Take 1 capsule (10 mg total) by mouth 4 (four) times a day as needed (Abdominal cramps). 12 capsule 0     LACTOBACILLUS RHAMNOSUS GG ORAL Take 1 capsule by mouth daily.       multivitamin therapeutic tablet Take 1 tablet by mouth daily.       predniSONE (DELTASONE) 20 MG tablet Take 40 mg by mouth daily with breakfast. 12 tablet 1     vancomycin (VANCOCIN) 125 MG capsule Take 125 mg by mouth 4 (four) times a day.       acetaminophen (TYLENOL) 500 MG tablet Take 1 tablet (500 mg total) by mouth every 4 (four) hours as needed.  0     fluconazole (DIFLUCAN) 150 MG tablet Take 1 tablet (150 mg total) by mouth daily for 7 doses. for yeast / thrush of tongue 7 tablet 0     oxyCODONE (ROXICODONE) 5 MG immediate release tablet Take 1 tablet (5 mg total) by  mouth every 4 (four) hours as needed. 13 tablet 0     No current facility-administered medications for this visit.         No Known Allergies    Patient Active Problem List   Diagnosis     Fever Of Unknown Origin     Crohn's Disease     Nontropical (Celiac) Sprue     Cellulitis Of The Leg     Skin Symptoms     Low vitamin B12 level     Crohn's disease of ileum, with fistula (H)     Clostridium difficile colitis     SIRS (systemic inflammatory response syndrome) (H)     Sepsis (H)     Colitis     Vitamin deficiency     Tachycardia     Muscle weakness (generalized)       Past Medical History:   Diagnosis Date     Crohn disease (H)        Past Surgical History:   Procedure Laterality Date     COLECTOMY N/A 2018    Procedure: TAKE DOWN SMALL BOWEL FISTULA;  Surgeon: Gt Koo MD;  Location: [facility];  Service: General     AL PART REMOVAL COLON W ANASTOMOSIS N/A 2018    Procedure: EXPLORATORY LAPAROTOMY ILEO COLIC RESECTION;  Surgeon: Gt Koo MD;  Location: [facility];  Service: General     SIGMOIDOSCOPY N/A 2019    Procedure: SIGMOIDOSCOPY wtih biopsies;  Surgeon: Abhinav Coello MD;  Location: [facility];  Service: Gastroenterology       Social History     Socioeconomic History     Marital status: Single     Spouse name: Not on file     Number of children: Not on file     Years of education: Not on file     Highest education level: Not on file   Occupational History     Not on file   Social Needs     Financial resource strain: Not on file     Food insecurity:     Worry: Not on file     Inability: Not on file     Transportation needs:     Medical: Not on file     Non-medical: Not on file   Tobacco Use     Smoking status: Former Smoker     Last attempt to quit: 2015     Years since quittin.1     Smokeless tobacco: Former User   Substance and Sexual Activity     Alcohol use: No     Frequency: Never     Drug use: No     Sexual activity: Not on file  "  Lifestyle     Physical activity:     Days per week: Not on file     Minutes per session: Not on file     Stress: Not on file   Relationships     Social connections:     Talks on phone: Not on file     Gets together: Not on file     Attends Worship service: Not on file     Active member of club or organization: Not on file     Attends meetings of clubs or organizations: Not on file     Relationship status: Not on file     Intimate partner violence:     Fear of current or ex partner: Not on file     Emotionally abused: Not on file     Physically abused: Not on file     Forced sexual activity: Not on file   Other Topics Concern     Not on file   Social History Narrative     Not on file       Patient Care Team:  Sergei Oliver MD as PCP - General  Sergei Oliver MD Khazaeli, Marjan S, PharmD as Pharmacist (Pharmacist)          Objective:     Vitals:    08/22/19 0952   BP: 100/70   Pulse: (!) 123   SpO2: 97%   Weight: 160 lb (72.6 kg)   Height: 5' 10\" (1.778 m)         Physical Exam:  He does have mild proptosis  Oropharynx throat shows thrush  TMs are clear  Nasal mucosa is clear  Lungs are clear today with equal and symmetric range next the chest x-ray shows no evidence of pneumonia  Cardiac S1-S2 is in a regular sinus rhythm is tachycardic  Abdomen soft he has presence of bowel sounds he has no rebound  He has palpable femoral pulses  Is no lower extreme edema  No skin rash        Recent Results (from the past 240 hour(s))   Electrocardiogram Perform and Read   Result Value Ref Range    SYSTOLIC BLOOD PRESSURE  mmHg    DIASTOLIC BLOOD PRESSURE  mmHg    VENTRICULAR RATE 104 BPM    ATRIAL RATE 104 BPM    P-R INTERVAL 116 ms    QRS DURATION 98 ms    Q-T INTERVAL 316 ms    QTC CALCULATION (BEZET) 415 ms    P Axis 49 degrees    R AXIS 75 degrees    T AXIS -16 degrees    MUSE DIAGNOSIS       Sinus tachycardia  Nonspecific ST and T wave abnormality  Abnormal ECG  When compared with ECG of 22-DEC-2018 04:24,  Sinus " rhythm has replaced Atrial fibrillation  Nonspecific T wave abnormality has replaced inverted T waves in Anterior leads  Confirmed by KADE EDWARDS MD LOC:[facility] ([id number]) on 8/22/2019 11:36:25 AM     D-dimer, Quantitative   Result Value Ref Range    D-Dimer, Quant 0.88 (H) <=0.50 FEU ug/mL   Morphology, Path Smear Review (MORP)   Result Value Ref Range    Pathology, Smear Review See Separate Pathology Report (!) (none)    WBC 15.3 (H) 4.0 - 11.0 thou/uL    RBC 5.33 4.40 - 6.20 mill/uL    Hemoglobin 13.7 (L) 14.0 - 18.0 g/dL    Hematocrit 44.4 40.0 - 54.0 %    MCV 83 80 - 100 fL    MCH 25.7 (L) 27.0 - 34.0 pg    MCHC 30.9 (L) 32.0 - 36.0 g/dL    RDW 14.4 11.0 - 14.5 %    Platelets 507 (H) 140 - 440 thou/uL    MPV 9.9 8.5 - 12.5 fL    Neutrophils % 84 (H) 50 - 70 %    Lymphocytes % 7 (L) 20 - 40 %    Monocytes % 8 2 - 10 %    Eosinophils % 0 0 - 6 %    Basophils % 0 0 - 2 %    Neutrophils Absolute 12.9 (H) 2.0 - 7.7 thou/uL    Lymphocytes Absolute 1.1 0.8 - 4.4 thou/uL    Monocytes Absolute 1.3 (H) 0.0 - 0.9 thou/uL    Eosinophils Absolute 0.0 0.0 - 0.4 thou/uL    Basophils Absolute 0.0 0.0 - 0.2 thou/uL   Comprehensive Metabolic Panel   Result Value Ref Range    Sodium 140 136 - 145 mmol/L    Potassium 4.7 3.5 - 5.0 mmol/L    Chloride 94 (L) 98 - 107 mmol/L    CO2 31 22 - 31 mmol/L    Anion Gap, Calculation 15 5 - 18 mmol/L    Glucose 102 70 - 125 mg/dL    BUN 10 8 - 22 mg/dL    Creatinine 0.82 0.70 - 1.30 mg/dL    GFR MDRD Af Amer >60 >60 mL/min/1.73m2    GFR MDRD Non Af Amer >60 >60 mL/min/1.73m2    Bilirubin, Total 0.4 0.0 - 1.0 mg/dL    Calcium 9.8 8.5 - 10.5 mg/dL    Protein, Total 7.4 6.0 - 8.0 g/dL    Albumin 3.5 3.5 - 5.0 g/dL    Alkaline Phosphatase 79 45 - 120 U/L    AST 8 0 - 40 U/L    ALT 11 0 - 45 U/L   Procalcitonin   Result Value Ref Range    Procalcitonin 0.07 0.00 - 0.49 ng/mL   Peripheral Blood Smear, Path Review   Result Value Ref Range    Case Report       Peripheral Blood Morphology Report                 Case: [id number]                                   Authorizing Provider:  Sergei Oliver MD       Collected:           08/22/2019 1133              Ordering Location:     [facility] Received:            08/23/2019 0721                                     Medicine/OB                                                                  Pathologist:           Car Jean MD                                                        Specimen:    Peripheral Blood                                                                           Final Diagnosis       PERIPHERAL BLOOD SMEAR:    - THROMBOCYTOSIS     - MILD NORMOCHROMIC-NORMOCYTIC ANEMIA      - LEUKOCYTOSIS WITH ABSOLUTE NEUTROPHILIA AND MILD ABSOLUTE MONOCYTOSIS     - NEGATIVE FOR ACUTE LEUKEMIA     - PLEASE SEE COMMENT    Comment       This patient's thrombocytosis, in conjunction with leukocytosis, could represent a systemic inflammatory disorder; however, the differential diagnosis should include a chronic myeloproliferative disorder. Recommend molecular diagnostic analysis of peripheral blood to rule out the presence of a JAK2 [id number] mutation. Recommend clinical follow-up.     Normocytic anemia can be caused by multiple etiologies including intrinsic bone marrow disease, renal disease, hepatic disease, endocrine disease, anemia of chronic disease, post-hemorrhagic anemia, and bone marrow infiltration by tumors. Recommend clinical correlation and follow-up.    Clinical Information R06.09  R00.0   K50.013       Charges CPT: 88484  ICD-10: D47.3, D64.9          Patient Instructions   Keep taking Kombucha GTC brand  6 oz three times daily    Consider [place] Opinion     I would like youi to have IV Fluids.  Await labs to direct type    Look into Medical Foods  [employer] Elemental Diet    Use 3 daily      Look up Candice Leos's Bone Broth online and Make and use like liquid    Finish Prednisone taper    If you are afebrile and  feeling strong and Crohn's has subsided should be OK for Stool Transplant    Talk to Oz Freed about Vancomycin.    DanL        EKG: Currently he is in sinus rhythm with nonspecific ST-T wave changes    Labs:  Labs pending at this time.  Results will be reviewed when available.    Immunization History   Administered Date(s) Administered     DT (pediatric) 05/27/1999     Hep B, historic 05/27/1999, 07/06/1999, 01/28/2000     Influenza,seasonal quad, PF, 36+MOS 12/15/2016     Td,adult,historic,unspecified 05/27/1999     Tdap 02/24/2011     Varicella 07/06/1999       Total time reviewing tests and providing  counseling and coordination of care time 1 hour  Greater than 50% of this time was  counseling and coordination of care    Electronically signed by Sergei Oliver MD 08/24/19 9:51 AM

## 2021-05-31 NOTE — PROGRESS NOTES
MTM Transition of Care Encounter  Assessment & Plan                                                     Post Discharge Medication Reconciliation Status: discharge medications reconciled, continue medications without change    Colitis/Crohn's: Patient not feeling well, but some improvement in pain. Continue prednisone taper, dicyclomine, Humira and APAP until appt with GI. Recommended discussing oxycodone with GI but it sounds like he does not need more at this time.     Recurrent Cdiff: Patient to continue vanco and cholestyramine. Follow up with GI 9/6/19 for fecal transplant.     Vitamin D Deficiency: Last Vitamin D level was slightly low. Is taking Vitamin D supplement and reviewed that very likely his level has increased. Recommend rechecking in at least 6 weeks, then can decrease supplementation to 2000 IU daily depending on result.     Anxiety: Stable. Recommended to continue current regimen.     Follow Up  As needed with MTM     Subjective & Objective                                                       Chencho Georges is a 33 y.o. male called for a transitions of care visit. he was discharged from [facility] on 8/14/19 for colitis.    Chief Complaint: feeling not too good. Rough ride. Infection is really draining him, not eating and sleeping.     Colitis/Crohn's: underwent flex sig which showed significant inflammation and ulceration leaning towards acute crohn's flare. evaluated by GI and infectious disease. Started on prednisone 40 mg daily. GI recommending tapering prednisone 10 mg every 2 weeks once he is down to 20 mg and then decrease by 5 mg every 1 to 2 weeks. Reports tolerating prednisone so far and taking with food. Continues weekly Humira.  Was recommended as outpatient after prednisone taper consider additional medication like methotrexate or azathioprine for the abdominal cramps. Started on dicyclomine 10 mg four times a day. Reports that he was told to take on a schedule after vanco -- he  was able to get this refilled and thinks it has helped slightly. Having dry mouth. Recommended to start oxycodone 5 mg PRN and APAP - completed oxycodone in 5 days. Continues APAP. Reports that his stomach has been calmer and he thinks he is ok without oxycodone for now.     Recurrent Cdiff: Continues on vanco 125 mg four times a day. C difficile toxin was negative 19.  Will be having fecal transplant on 19. [facility] appt on .   Continues to have frequent BMs and diarrhea. Continues cholestyramine 2 packets two times a day which has been helpful. Cholestyramine started during - admission.     Vitamin D Deficiency: Started on Vitamin D 5000 IU at discharge.   Vitamin D, Total (25-Hydroxy)   Date Value Ref Range Status   2019 28.1 (L) 30.0 - 80.0 ng/mL Final     Anxiety: Currently taking citalopram 20 mg daily. Reports he has been on it a long time and it has been helping out a lot.     PMH: reviewed in EPIC   Allergies/ADRs: reviewed in EPIC   Alcohol: reviewed in EPIC  Tobacco:   Social History     Tobacco Use   Smoking Status Former Smoker     Last attempt to quit: 2015     Years since quittin.1   Smokeless Tobacco Former User     Recent Vitals:   BP Readings from Last 3 Encounters:   19 117/85   19 108/62   19 110/78      Wt Readings from Last 3 Encounters:   19 182 lb (82.6 kg)   19 175 lb (79.4 kg)   19 175 lb (79.4 kg)     ----------------    The patient declined an after visit summary    I spent 15 minutes with this patient today;  . All changes were made via collaborative practice agreement with Sergei Oliver MD. A copy of the visit note was provided to the patient's provider.     Kavin VeraD., BCACP  Medication Therapy Management Pharmacist  [facility]     Current Outpatient Medications   Medication Sig Dispense Refill     acetaminophen (TYLENOL) 500 MG tablet Take 1 tablet (500 mg total) by mouth every 4  (four) hours as needed.  0     adalimumab (HUMIRA) 40 mg/0.8 mL injection Inject 40 mg under the skin every 7 days. On Sundays       cholecalciferol, vitamin D3, 5,000 unit Tab Take 1 tablet (5,000 Units total) by mouth daily.  0     cholestyramine-aspartame, sugar free, (CHOLESTYRAMINE LIGHT) 4 gram PwPk Take 2 packets by mouth 2 (two) times a day.       citalopram (CELEXA) 20 MG tablet Take 20 mg by mouth every evening.              dicyclomine (BENTYL) 10 MG capsule Take 1 capsule (10 mg total) by mouth 4 (four) times a day as needed (Abdominal cramps). 12 capsule 0     LACTOBACILLUS RHAMNOSUS GG ORAL Take 1 capsule by mouth daily.       multivitamin therapeutic tablet Take 1 tablet by mouth daily.       oxyCODONE (ROXICODONE) 5 MG immediate release tablet Take 1 tablet (5 mg total) by mouth every 4 (four) hours as needed. 13 tablet 0     predniSONE (DELTASONE) 20 MG tablet Take 40 mg by mouth daily with breakfast. 12 tablet 1     vancomycin (VANCOCIN) 125 MG capsule Take 125 mg by mouth 4 (four) times a day.       No current facility-administered medications for this visit.

## 2021-05-31 NOTE — TELEPHONE ENCOUNTER
I spoke with this patient on the phone today for a transitions of care MTM appt. Looks like on Thursday 8/22 he has TWO appointments scheduled with Dr. Oliver. He would like to keep the 10am appt (cancel the 820 am) appt. Looks like one is for INF and one is for pre-op. Can we cancel the 820am appointment and turn the 10am into also a pre-op?? His fecal transplant will be on 9/6/19. Thanks!     Johnna Lindo, Pharm.D., BCACP  Medication Therapy Management Pharmacist  [facility]

## 2021-05-31 NOTE — PATIENT INSTRUCTIONS - HE
Keep taking Kombucha GTC brand  6 oz three times daily    Consider [place] Opinion     I would like youi to have IV Fluids.  Await labs to direct type    Look into Medical Foods  [employer] Elemental Diet    Use 3 daily      Look up Candice Leos's Bone Broth online and Make and use like liquid    Finish Prednisone taper    If you are afebrile and feeling strong and Crohn's has subsided should be OK for Stool Transplant    Talk to Oz Freed about Vancomycin.    EzL

## 2021-06-01 NOTE — TELEPHONE ENCOUNTER
Name of form/paperwork: CYDNEY  Have you been seen for this request: No.  Patient stated he originally requested these forms be completed by Gastroenterology.  Patient stated the forms were returned to him asking him to make this request to have the forms completed by his primary care provider.  Do we have the form: Patient will be faxing to [phone].  When is form needed by: as soon as possible   How would you like the form returned: Fax  Fax Number: [phone]  Patient is requesting the return fax be put to the attention of Mira Rapp.  Patient stated this is the HR Department through his employer.   Patient Notified form requests are processed in 3-5 business days: Yes  (If patient needs form sooner, please note that in this message.)  Okay to leave a detailed message? Yes  [phone]    Patient is requesting a call to inform him whether or not the form had been received by the clinic successfully.

## 2021-06-01 NOTE — PATIENT INSTRUCTIONS - HE
Stay clinically well  Inform the surgical team of any change in her clinical condition  Discussed with Dr. Freed if you need any further blood work    Avoid any ibuprofen Aleve as these can predispose bleeding if there were biopsy needed    Tylenol is okay up to the day of surgery    Stay clinically hydrated and eat high nutrient foods up until surgery  Stay rested and do not overexert the day prior to surgery

## 2021-06-01 NOTE — PROGRESS NOTES
Preoperative Exam    Scheduled Procedure: Fecal Transplant/ Colonoscopy  Surgery Date:  10/4/19  Surgery Location: [facility]    Surgeon:  Dr. Barth    Assessment/Plan:     Problem List Items Addressed This Visit     None            Surgical Procedure Risk: Low (reported cardiac risk generally < 1%)  Have you had prior anesthesia?: Yes  Have you or any family members had a previous anesthesia reaction:  No  Do you or any family members have a history of a clotting or bleeding disorder?: Yes: ma grandmother  Cardiac Risk Assessment: no increased risk for major cardiac complications    APPROVAL GIVEN to proceed with proposed procedure, without further diagnostic evaluation    Please Note:  none     Functional Status: Independent  Patient plans to recover at home with family.     Subjective:      Chencho Georges is a 33 y.o. male who presents for a preoperative consultation.  Crohns  Worse  Now on Humira   Managed Dr Freed  Consult with Dutton suggested Continue here      Prednisone    This AM   40 mg >> 30 mg for two weeks now    Celexa New 20 mg Delayed Ejac     All other systems reviewed and are negative, other than those listed in the HPI.    Pertinent History  Do you have difficulty breathing or chest pain after walking up a flight of stairs: No  History of obstructive sleep apnea: No  Steroid use in the last 6 months: Yes: Predisone  Current   Frequent Aspirin/NSAID use: No  Prior Blood Transfusion: No  Prior Blood Transfusion Reaction: No  If for some reason prior to, during or after the procedure, if it is medically indicated, would you be willing to have a blood transfusion?:  There is no transfusion refusal.    Current Outpatient Medications   Medication Sig Dispense Refill     adalimumab (HUMIRA) 40 mg/0.8 mL injection Inject 40 mg under the skin every 7 days. On Sundays       cholecalciferol, vitamin D3, 5,000 unit Tab Take 1 tablet (5,000 Units total) by mouth daily.  0     cholestyramine-aspartame, sugar  free, (CHOLESTYRAMINE LIGHT) 4 gram PwPk Take 2 packets by mouth 2 (two) times a day.       citalopram (CELEXA) 20 MG tablet Take 20 mg by mouth every evening.              dicyclomine (BENTYL) 10 MG capsule Take 1 capsule (10 mg total) by mouth 4 (four) times a day as needed (Abdominal cramps). 12 capsule 0     LACTOBACILLUS RHAMNOSUS GG ORAL Take 1 capsule by mouth daily.       multivitamin therapeutic tablet Take 1 tablet by mouth daily.       oxyCODONE (ROXICODONE) 5 MG immediate release tablet Take 1 tablet (5 mg total) by mouth every 4 (four) hours as needed. 13 tablet 0     predniSONE (DELTASONE) 20 MG tablet Take 40 mg by mouth daily with breakfast. 12 tablet 1     vancomycin (VANCOCIN) 125 MG capsule Take 125 mg by mouth 4 (four) times a day.       acetaminophen (TYLENOL) 500 MG tablet Take 1 tablet (500 mg total) by mouth every 4 (four) hours as needed.  0     No current facility-administered medications for this visit.         No Known Allergies    Patient Active Problem List   Diagnosis     Fever Of Unknown Origin     Crohn's Disease     Nontropical (Celiac) Sprue     Cellulitis Of The Leg     Skin Symptoms     Low vitamin B12 level     Crohn's disease of ileum, with fistula (H)     Clostridium difficile colitis     SIRS (systemic inflammatory response syndrome) (H)     Sepsis (H)     Colitis     Vitamin deficiency     Tachycardia     Muscle weakness (generalized)       Past Medical History:   Diagnosis Date     Crohn disease (H)        Past Surgical History:   Procedure Laterality Date     COLECTOMY N/A 12/21/2018    Procedure: TAKE DOWN SMALL BOWEL FISTULA;  Surgeon: Gt Koo MD;  Location: [facility];  Service: General     NC PART REMOVAL COLON W ANASTOMOSIS N/A 12/21/2018    Procedure: EXPLORATORY LAPAROTOMY ILEO COLIC RESECTION;  Surgeon: Gt Koo MD;  Location: [facility] OR;  Service: General     SIGMOIDOSCOPY N/A 8/9/2019    Procedure: SIGMOIDOSCOPY wtih biopsies;   Surgeon: Abhinav Coello MD;  Location: [facility];  Service: Gastroenterology       Social History     Socioeconomic History     Marital status:      Spouse name: Not on file     Number of children: Not on file     Years of education: Not on file     Highest education level: Not on file   Occupational History     Not on file   Social Needs     Financial resource strain: Not on file     Food insecurity:     Worry: Not on file     Inability: Not on file     Transportation needs:     Medical: Not on file     Non-medical: Not on file   Tobacco Use     Smoking status: Former Smoker     Last attempt to quit: 2015     Years since quittin.2     Smokeless tobacco: Former User   Substance and Sexual Activity     Alcohol use: No     Frequency: Never     Drug use: No     Sexual activity: Not on file   Lifestyle     Physical activity:     Days per week: Not on file     Minutes per session: Not on file     Stress: Not on file   Relationships     Social connections:     Talks on phone: Not on file     Gets together: Not on file     Attends [employer] service: Not on file     Active member of club or organization: Not on file     Attends meetings of clubs or organizations: Not on file     Relationship status: Not on file     Intimate partner violence:     Fear of current or ex partner: Not on file     Emotionally abused: Not on file     Physically abused: Not on file     Forced sexual activity: Not on file   Other Topics Concern     Not on file   Social History Narrative     Not on file       Patient Care Team:  Sergei Oliver MD as PCP - General  Sergei Oliver MD Larkin, Daniel E, MD as Assigned PCP  Johnna Lindo, PharmD as Pharmacist (Pharmacist)          Objective:     Vitals:    19 0806   BP: 112/80   Pulse: 94   Weight: 175 lb (79.4 kg)         Physical Exam:      Physical:  General Appearance: Healthier-appearingy.   Head:  No deformity  Eyes: Sclerae white, pupils equal and  reactive, extra ocular movements intact   Ears: Well-positioned, well-formed pinnae; TM pearly white, translucent, no bulging   Nose: Clear, normal mucosa no drainage or crusting  Throat: Lips, tongue, and mucosa are moist, pink and intact; tongue no thrush oral pharynx no injection or lesions dental plaques dental caries right lower back molars  Neck: Supple, symmetric ROM no nodes   Chest: Lungs clear to auscultation, no retractions  Heart: Regular rate & rhythm, S1 S2, no murmur  Abdomen: Soft, non-tender, no masses; umbilical area normal positive bowel sounds  Pulses: Equal femoral pulses  : No hernia palpable   Extremities: Well-perfused, warm and dry, No Edema  Palpable Pulses Bilateral  Neuro: Easily aroused good tone NO tremor   Skin  No Rash        There are no Patient Instructions on file for this visit.    EKG:  Not indicated     Labs:  The last exams were  No data recorded     Results for orders placed or performed in visit on 08/22/19   Comprehensive Metabolic Panel   Result Value Ref Range    Sodium 140 136 - 145 mmol/L    Potassium 4.7 3.5 - 5.0 mmol/L    Chloride 94 (L) 98 - 107 mmol/L    CO2 31 22 - 31 mmol/L    Anion Gap, Calculation 15 5 - 18 mmol/L    Glucose 102 70 - 125 mg/dL    BUN 10 8 - 22 mg/dL    Creatinine 0.82 0.70 - 1.30 mg/dL    GFR MDRD Af Amer >60 >60 mL/min/1.73m2    GFR MDRD Non Af Amer >60 >60 mL/min/1.73m2    Bilirubin, Total 0.4 0.0 - 1.0 mg/dL    Calcium 9.8 8.5 - 10.5 mg/dL    Protein, Total 7.4 6.0 - 8.0 g/dL    Albumin 3.5 3.5 - 5.0 g/dL    Alkaline Phosphatase 79 45 - 120 U/L    AST 8 0 - 40 U/L    ALT 11 0 - 45 U/L     Lab Results   Component Value Date    WBC 15.3 (H) 08/22/2019    HGB 13.7 (L) 08/22/2019    HCT 44.4 08/22/2019    MCV 83 08/22/2019     (H) 08/22/2019           The last exams were  No data recorded    Immunization History   Administered Date(s) Administered     DT (pediatric) 05/27/1999     Hep B, historic 05/27/1999, 07/06/1999, 01/28/2000      Influenza,seasonal quad, PF, =/> 6months 12/15/2016     Td,adult,historic,unspecified 05/27/1999     Tdap 02/24/2011     Varicella 07/06/1999           Electronically signed by Sergei Oliver MD 09/24/19 8:09 AM

## 2021-06-01 NOTE — TELEPHONE ENCOUNTER
Who is calling:  The patient  Reason for Call:  The patient is calling to check the status of the FMLA paperwork request. The patient is would like a return phone call today to get an update on the expected completion.    Date of last appointment with primary care: 9/10/2019  Okay to leave a detailed message: Yes

## 2021-06-02 VITALS — BODY MASS INDEX: 26.6 KG/M2 | WEIGHT: 185.8 LBS | HEIGHT: 70 IN

## 2021-06-02 VITALS — WEIGHT: 206.5 LBS | BODY MASS INDEX: 29.63 KG/M2

## 2021-06-03 ENCOUNTER — RECORDS - HEALTHEAST (OUTPATIENT)
Dept: ADMINISTRATIVE | Facility: OTHER | Age: 36
End: 2021-06-03

## 2021-06-03 VITALS
BODY MASS INDEX: 25.11 KG/M2 | DIASTOLIC BLOOD PRESSURE: 80 MMHG | HEART RATE: 94 BPM | SYSTOLIC BLOOD PRESSURE: 112 MMHG | WEIGHT: 175 LBS

## 2021-06-03 VITALS — WEIGHT: 160 LBS | BODY MASS INDEX: 22.9 KG/M2 | HEIGHT: 70 IN

## 2021-06-03 VITALS — HEIGHT: 70 IN | WEIGHT: 182 LBS | BODY MASS INDEX: 26.05 KG/M2

## 2021-06-03 LAB
ALT SERPL W/O P-5'-P-CCNC: 19 U/L (ref 0–78)
AST SERPL-CCNC: 20 U/L (ref 0–37)

## 2021-06-03 NOTE — TELEPHONE ENCOUNTER
Chencho came into the clinic today to drop off forms for Dr Oliver. Please complete FMLA ppwk. Patient states he has been seen for this issue. When done with forms, please fax to [employer]. Fax number highlighted on last page.     Fax # [phone]    Pt is requesting a phone call when this has been completed and faxed.

## 2021-06-03 NOTE — TELEPHONE ENCOUNTER
Name of form/paperwork: [place]     Have you been seen for this request: Yes:  09/24/2019     Do we have the form:   Yes- Patient states he faxed the form today and will drop one off as well.    When is form needed by: ASAP    How would you like the form returned: Fax    Fax Number: [phone]  [facility]    Patient Notified form requests are processed in 3-5 business days: Yes  (If patient needs form sooner, please note that in this message.)    Okay to leave a detailed message? Yes    Patient states [employer] is requesting PCP to complete the FMLA forms.  Please call patient with receipt of forms and expended time of completion.

## 2021-06-04 NOTE — TELEPHONE ENCOUNTER
Name of form/paperwork: FMLA  Have you been seen for this request: Yes:  9/24/19  Do we have the form: Yes- 11/18/19  When is form needed by: As soon as possible How would you like the form returned: fax  Fax Number: [phone]  [facility]  Patient Notified form requests are processed in 3-5 business days: Yes  (If patient needs form sooner, please note that in this message.)  Okay to leave a detailed message? No  Patient states the FMLA form his employeer received was not completed by the provider the Patient Condition section should be completed what medical reasons to be on Leave for work the provider need  to describe and diagnoses . Patient requested to process as soon as possible.

## 2021-06-09 ENCOUNTER — RECORDS - HEALTHEAST (OUTPATIENT)
Dept: HEALTH INFORMATION MANAGEMENT | Facility: CLINIC | Age: 36
End: 2021-06-09

## 2021-06-14 NOTE — TELEPHONE ENCOUNTER
Patient called on Friday  Doing well  Monitoring symptoms and saturations    We will follow up as needed    Karlee

## 2021-06-14 NOTE — TELEPHONE ENCOUNTER
Who is calling:  Patient   Reason for Call:  Im asking for Sergei Oliver MD to return my call to discuss my covid Sx. Patient has approved a [employer] message as well  Date of last appointment with primary care: 1/12/2021  Okay to leave a detailed message: Yes

## 2021-06-14 NOTE — TELEPHONE ENCOUNTER
Pt called in states he has congestion.  Pt has test or smell good.  No sinus pain.  No fever.  No  Cough.  No nasal discharge today.  No body ache.  No sore throat at this time.  Pt has history of crohn's disease.  No difficulty breathing.  The disposition is to home care.  Pt request appointment.  Care advice given per protocol.  Patient agrees with care advice given.   Agreed to call back if he has additional symptoms or questions.      Doug De La Rosa RN, [facility] Triage/Med Refill 1/11/2021 7:35 PM        Reason for Disposition    COVID-19 Testing, questions about    Additional Information    Negative: Severe difficulty breathing (e.g., struggling for each breath, speaks in single words)    Negative: Sounds like a life-threatening emergency to the triager    Negative: [1] Sinus infection AND [2] taking an antibiotic AND [3] symptoms continue    Negative: [1] Difficulty breathing AND [2] not from stuffy nose (e.g., not relieved by cleaning out the nose)    Negative: SEVERE difficulty breathing (e.g., struggling for each breath, speaks in single words)    Negative: Difficult to awaken or acting confused (e.g., disoriented, slurred speech)    Negative: Bluish (or gray) lips or face now    Negative: Shock suspected (e.g., cold/pale/clammy skin, too weak to stand, low BP, rapid pulse)    Negative: Sounds like a life-threatening emergency to the triager    Negative: [1] COVID-19 exposure AND [2] no symptoms    Negative: [1] Lives with someone known to have influenza (flu test positive) AND [2] flu-like symptoms (e.g., cough, runny nose, sore throat, SOB; with or without fever)    Negative: [1] Adult with possible COVID-19 symptoms AND [2] triager concerned about severity of symptoms or other causes    Negative: Immunization reaction suspected (e.g., fever, headache, muscle aches occurring during days 1-3 days after immunization)    Negative: COVID-19 and breastfeeding, questions about    Negative: SEVERE or  constant chest pain or pressure (Exception: mild central chest pain, present only when coughing)    Negative: MODERATE difficulty breathing (e.g., speaks in phrases, SOB even at rest, pulse 100-120)    Negative: [1] Headache AND [2] stiff neck (can't touch chin to chest)    Negative: MILD difficulty breathing (e.g., minimal/no SOB at rest, SOB with walking, pulse <100)    Negative: Chest pain or pressure    Negative: Patient sounds very sick or weak to the triager    Negative: Fever > 103 F (39.4 C)    Negative: [1] Fever > 101 F (38.3 C) AND [2] age > 60    Negative: [1] Fever > 100.0 F (37.8 C) AND [2] bedridden (e.g., nursing home patient, CVA, chronic illness, recovering from surgery)    Negative: [1] HIGH RISK patient (e.g., age > 64 years, diabetes, heart or lung disease, weak immune system) AND [2] new or worsening symptoms    Negative: [1] HIGH RISK patient AND [2] influenza is widespread in the community AND [3] ONE OR MORE respiratory symptoms: cough, sore throat, runny or stuffy nose    Negative: [1] HIGH RISK patient AND [2] influenza exposure within the last 7 days AND [3] ONE OR MORE respiratory symptoms: cough, sore throat, runny or stuffy nose    Negative: Fever present > 3 days (72 hours)    Negative: [1] Fever returns after gone for over 24 hours AND [2] symptoms worse or not improved    Negative: [1] Continuous (nonstop) coughing interferes with work or school AND [2] no improvement using cough treatment per protocol    Negative: [1] COVID-19 infection suspected by caller or triager AND [2] mild symptoms (cough, fever, or others) AND [3] no complications or SOB    Negative: Cough present > 3 weeks    Negative: COVID-19 Home Isolation, questions about    Negative: [1] COVID-19 diagnosed by HCP (doctor, NP or PA) AND [2] mild symptoms (e.g., cough, fever, others) AND [3] no complications or SOB    Negative: [1] COVID-19 diagnosed by positive lab test AND [2] mild symptoms (e.g., cough, fever,  others) AND [3] no complications or SOB    Protocols used: CORONAVIRUS (COVID-19) DIAGNOSED OR [id number] 12.1.20, SINUS PAIN OR CONGESTION-A-AH

## 2021-06-14 NOTE — PATIENT INSTRUCTIONS - HE
Covid test today    Reminded him if his breathing is making him afraid he needs an in person visit    Try to obtain a set monitor to monitor oxygen saturations as of primary vital sign    Plan to remain out of work for 14 days    Instructions for Patients      Thank you for taking steps to prevent the spread of this virus.  o Limit your contact with others.  o Wear a simple mask to cover your cough.  o Wash your hands well and often.  o If you need medical care, go to [email or web address] or contact your health care provider.     For more about COVID-19 and caring for yourself at home, visit the CDC website at https://www.cdc.gov/coronavirus/2019-ncov/about/steps-when-sick.html.     To learn about care at [facility], please go to [email or web address].     [facility] clinical trials (COVID-19 research studies): [email or web address]-clinical-trials    Below are the COVID-19 hotlines at the [facility] ([facility]). Interpreters are available.     For health questions: Call [phone] or 1-328.371.7729 (7 a.m. to 7 p.m.)    For questions about schools and childcare: Call 323-738-7713 or 1-519.690.2060 (7 a.m. to 7 p.m.)

## 2021-06-14 NOTE — TELEPHONE ENCOUNTER
".Coronavirus (COVID-19) Notification    Caller Name (Patient, parent, daughter/son, grandparent, etc)Chencho Georges (patient)_  Chencho Georges (patient)  Reason for call  Notify of Positive Coronavirus (COVID-19) lab results, assess symptoms,  review  [facility] recommendations    Lab Result    Lab test:  2019-nCoV rRt-PCR or SARS-CoV-2 PCR    Oropharyngeal AND/OR nasopharyngeal swabs is POSITIVE for 2019-nCoV RNA/SARS-COV-2 PCR (COVID-19 virus)    RN Recommendations/Instructions per [facility] Coronavirus COVID-19 recommendations    Brief introduction script  Introduce self and then review script:  \"I am calling on behalf of [employer].  We were notified that your Coronavirus test (COVID-19) for was POSITIVE for the virus.  I have some information to relay to you but first I wanted to mention that the [place] Dept of Health will be contacting you shortly [it's possible MD already called Patient] to talk to you more about how you are feeling and other people you have had contact with who might now also have the virus.  Also,  [facility] is Partnering with the [facility] for Covid-19 research, you may be contacted directly by research staff.\"    Assessment (Inquire about Patient's current symptoms)   Assessment   Current Symptoms at time of phone call: (if no symptoms, document No symptoms] Loss of taste and smell   Symptom onset (if applicable) 1/9/2021     If at time of call, Patients symptoms hare worsened, the Patient should contact 911 or have someone drive them to Emergency Dept promptly:      If Patient calling 911, inform 911 personal that you have tested positive for the Coronavirus (COVID-19).  Place mask on and await 911 to arrive.    If Emergency Dept, If possible, please have another adult drive you to the Emergency Dept but you need to wear mask when in contact with other people.        Monoclonal Antibody Administration    You may be eligible to receive a new treatment with a " "monoclonal antibody for preventing hospitalization in patients at high risk for complications from COVID-19.   This medication is still experimental and available on a limited basis; it is given through an IV and must be given at an infusion center. Please note that not all people who are eligible will receive the medication since it is in limited supply.     Are you interested in being considered for this medication?  No.  Does the patient fit the criteria: Yes: COVID-19 Monoclonal Antibody Referral completed. Diagnosis code: COVID-19   U07.1    If patient qualifies based on above criteria:  \"We will contact you if you are selected to receive the medication in the next 1-2 days.   This is time sensitive and if you are not selected in the next 1-2 days, you will not receive the medication.  If you do not receive a call to schedule, you have not been selected.\"    Review information with Patient    Your result was positive. This means you have COVID-19 (coronavirus).  We have sent you a letter that reviews the information that I'll be reviewing with you now.    How can I protect others?    If you have symptoms: stay home and away from others (self-isolate) until:    You've had no fever--and no medicine that reduces fever--for 1 full day (24 hours). And      Your other symptoms have gotten better. For example, your cough or breathing has improved. And     At least 10 days have passed since your symptoms started. (If you ve been told by a doctor that you have a weak immune system, wait 20 days.)     If you don't have symptoms: Stay home and away from others (self-isolate) until at least 10 days have passed since your first positive COVID-19 test. (Date test collected).    During this time:    Stay in your own room, including for meals. Use your own bathroom if you can.    Stay away from others in your home. No hugging, kissing or shaking hands. No visitors.     Don't go to work, school or anywhere else.     Clean  high " touch  surfaces often (doorknobs, counters, handles, etc.). Use a household cleaning spray or wipes. You'll find a full list on the EPA website at www.epa.gov/pesticide-registration/list-n-disinfectants-use-against-sars-cov-2.     Cover your mouth and nose with a mask, tissue or other face covering to avoid spreading germs.    Wash your hands and face often with soap and water.    Caregivers in these groups are at risk for severe illness due to COVID-19:  o People 65 years and older  o People who live in a nursing home or long-term care facility  o People with chronic disease (lung, heart, cancer, diabetes, kidney, liver, immunologic)  o People who have a weakened immune system, including those who:  - Are in cancer treatment  - Take medicine that weakens the immune system, such as corticosteroids  - Had a bone marrow or organ transplant  - Have an immune deficiency  - Have poorly controlled HIV or AIDS  - Are obese (body mass index of 40 or higher)  - Smoke regularly    Caregivers should wear gloves while washing dishes, handling laundry and cleaning bedrooms and bathrooms.    Wash and dry laundry with special caution. Don't shake dirty laundry, and use the warmest water setting you can.    If you have a weakened immune system, ask your doctor about other actions you should take.    For more tips, go to www.cdc.gov/coronavirus/2019-ncov/downloads/10Things.pdf.    You should not go back to work until you meet the guidelines above for ending your home isolation. You don't need to be retested for COVID-19 before going back to work--studies show that you won't spread the virus if it's been at least 10 days since your symptoms started (or 20 days, if you have a weak immune system).    Employers: This document serves as formal notice of your employee's medical guidelines for going back to work. They must meet the above guidelines before going back to work in person.    How can I take care of myself?    1. Get lots of  rest. Drink extra fluids (unless a doctor has told you not to).    2. Take Tylenol (acetaminophen) for fever or pain. If you have liver or kidney problems, ask your family doctor if it's okay to take Tylenol.     Take either:     650 mg (two 325 mg pills) every 4 to 6 hours, or     1,000 mg (two 500 mg pills) every 8 hours as needed.     Note: Don't take more than 3,000 mg in one day. Acetaminophen is found in many medicines (both prescribed and over-the-counter medicines). Read all labels to be sure you don't take too much.    For children, check the Tylenol bottle for the right dose (based on their age or weight).    3. If you have other health problems (like cancer, heart failure, an organ transplant or severe kidney disease): Call your specialty clinic if you don't feel better in the next 2 days.    4. Know when to call 911: Emergency warning signs include:    Trouble breathing or shortness of breath    Pain or pressure in the chest that doesn't go away    Feeling confused like you haven't felt before, or not being able to wake up    Bluish-colored lips or face    5. Sign up for [employer]. We know it's scary to hear that you have COVID-19. We want to track your symptoms to make sure you're okay over the next 2 weeks. Please look for an email from [employer]--this is a free, online program that we'll use to keep in touch. To sign up, follow the link in the email. Learn more at [email or web address].    Where can I get more information?    [facility]: [email or web address]    Coronavirus Basics: www.health.[facility].[place]/diseases/coronavirus/basics.html    What to Do If You're Sick: www.cdc.gov/coronavirus/2019-ncov/about/steps-when-sick.html    Ending Home Isolation: www.cdc.gov/coronavirus/2019-ncov/hcp/disposition-in-home-patients.html     Caring for Someone with COVID-19: www.cdc.gov/coronavirus/2019-ncov/if-you-are-sick/care-for-someone.html     [facility] clinical trials  (COVID-19 research studies): clinicalaffairs.[facility]-clinical-trials     A Positive COVID-19 letter will be sent via [employer] or the Mail.  (Exception, no letters sent to Presurgerical/Preprocedure Patients)    Keke Cohen RN

## 2021-06-14 NOTE — TELEPHONE ENCOUNTER
"Coronavirus (COVID-19) Notification    Caller Name (Patient, parent, daughter/son, grandparent, etc)  Patient    Reason for call  Notify of Positive Coronavirus (COVID-19) lab results, assess symptoms,  review [facility] recommendations    Lab Result    Lab test:  2019-nCoV rRt-PCR or SARS-CoV-2 PCR    Oropharyngeal AND/OR nasopharyngeal swabs is POSITIVE for 2019-nCoV RNA/SARS-COV-2 PCR (COVID-19 virus)    RN Recommendations/Instructions per [facility] Coronavirus COVID-19 recommendations    Brief introduction script  Introduce self and then review script:  \"I am calling on behalf of [employer].  We were notified that your Coronavirus test (COVID-19) for was POSITIVE for the virus.  I have some information to relay to you but first I wanted to mention that the [place] Dept of Health will be contacting you shortly [it's possible MD already called Patient] to talk to you more about how you are feeling and other people you have had contact with who might now also have the virus.  Also, [facility] is Partnering with the [facility] for Covid-19 research, you may be contacted directly by research staff.\"    Assessment (Inquire about Patient's current symptoms)   Assessment   Current Symptoms at time of phone call: (if no symptoms, document No symptoms] No taste or smell, eye pressure, congested and tired   Symptom onset (if applicable) 1/10/2021     If at time of call, Patients symptoms hare worsened, the Patient should contact 911 or have someone drive them to Emergency Dept promptly:      If Patient calling 911, inform 911 personal that you have tested positive for the Coronavirus (COVID-19).  Place mask on and await 911 to arrive.    If Emergency Dept, If possible, please have another adult drive you to the Emergency Dept but you need to wear mask when in contact with other people.        Monoclonal Antibody Administration    You may be eligible to receive a new treatment with a monoclonal " "antibody for preventing hospitalization in patients at high risk for complications from COVID-19.   This medication is still experimental and available on a limited basis; it is given through an IV and must be given at an infusion center. Please note that not all people who are eligible will receive the medication since it is in limited supply.     Are you interested in being considered for this medication?  No.  Does the patient fit the criteria: Patient declined    If patient qualifies based on above criteria:  \"We will contact you if you are selected to receive the medication in the next 1-2 days.   This is time sensitive and if you are not selected in the next 1-2 days, you will not receive the medication.  If you do not receive a call to schedule, you have not been selected.\"    Review information with Patient    Your result was positive. This means you have COVID-19 (coronavirus).  We have sent you a letter that reviews the information that I'll be reviewing with you now.    How can I protect others?    If you have symptoms: stay home and away from others (self-isolate) until:    You've had no fever--and no medicine that reduces fever--for 1 full day (24 hours). And      Your other symptoms have gotten better. For example, your cough or breathing has improved. And     At least 10 days have passed since your symptoms started. (If you ve been told by a doctor that you have a weak immune system, wait 20 days.)     If you don't have symptoms: Stay home and away from others (self-isolate) until at least 10 days have passed since your first positive COVID-19 test. (Date test collected).    During this time:    Stay in your own room, including for meals. Use your own bathroom if you can.    Stay away from others in your home. No hugging, kissing or shaking hands. No visitors.     Don't go to work, school or anywhere else.     Clean  high touch  surfaces often (doorknobs, counters, handles, etc.). Use a household " cleaning spray or wipes. You'll find a full list on the EPA website at www.epa.gov/pesticide-registration/list-n-disinfectants-use-against-sars-cov-2.     Cover your mouth and nose with a mask, tissue or other face covering to avoid spreading germs.    Wash your hands and face often with soap and water.    Caregivers in these groups are at risk for severe illness due to COVID-19:  o People 65 years and older  o People who live in a nursing home or long-term care facility  o People with chronic disease (lung, heart, cancer, diabetes, kidney, liver, immunologic)  o People who have a weakened immune system, including those who:  - Are in cancer treatment  - Take medicine that weakens the immune system, such as corticosteroids  - Had a bone marrow or organ transplant  - Have an immune deficiency  - Have poorly controlled HIV or AIDS  - Are obese (body mass index of 40 or higher)  - Smoke regularly    Caregivers should wear gloves while washing dishes, handling laundry and cleaning bedrooms and bathrooms.    Wash and dry laundry with special caution. Don't shake dirty laundry, and use the warmest water setting you can.    If you have a weakened immune system, ask your doctor about other actions you should take.    For more tips, go to www.cdc.gov/coronavirus/2019-ncov/downloads/10Things.pdf.    You should not go back to work until you meet the guidelines above for ending your home isolation. You don't need to be retested for COVID-19 before going back to work--studies show that you won't spread the virus if it's been at least 10 days since your symptoms started (or 20 days, if you have a weak immune system).    Employers: This document serves as formal notice of your employee's medical guidelines for going back to work. They must meet the above guidelines before going back to work in person.    How can I take care of myself?    1. Get lots of rest. Drink extra fluids (unless a doctor has told you not to).    2. Take  Tylenol (acetaminophen) for fever or pain. If you have liver or kidney problems, ask your family doctor if it's okay to take Tylenol.     Take either:     650 mg (two 325 mg pills) every 4 to 6 hours, or     1,000 mg (two 500 mg pills) every 8 hours as needed.     Note: Don't take more than 3,000 mg in one day. Acetaminophen is found in many medicines (both prescribed and over-the-counter medicines). Read all labels to be sure you don't take too much.    For children, check the Tylenol bottle for the right dose (based on their age or weight).    3. If you have other health problems (like cancer, heart failure, an organ transplant or severe kidney disease): Call your specialty clinic if you don't feel better in the next 2 days.    4. Know when to call 911: Emergency warning signs include:    Trouble breathing or shortness of breath    Pain or pressure in the chest that doesn't go away    Feeling confused like you haven't felt before, or not being able to wake up    Bluish-colored lips or face    5. Sign up for [employer]. We know it's scary to hear that you have COVID-19. We want to track your symptoms to make sure you're okay over the next 2 weeks. Please look for an email from [employer]--this is a free, online program that we'll use to keep in touch. To sign up, follow the link in the email. Learn more at [email or web address].    Where can I get more information?    [facility]: [email or web address]    Coronavirus Basics: www.health.[facility]/diseases/coronavirus/basics.html    What to Do If You're Sick: www.cdc.gov/coronavirus/2019-ncov/about/steps-when-sick.html    Ending Home Isolation: www.cdc.gov/coronavirus/2019-ncov/hcp/disposition-in-home-patients.html     Caring for Someone with COVID-19: www.cdc.gov/coronavirus/2019-ncov/if-you-are-sick/care-for-someone.html     [facility] clinical trials (COVID-19 research studies): clinicalaffairs.[facility]-clinical-trials      A Positive COVID-19 letter will be sent via [employer] or the Mail.  (Exception, no letters sent to Presurgerical/Preprocedure Patients)    Steffanie Krueger LPN

## 2021-06-14 NOTE — PROGRESS NOTES
"  No problem-specific Assessment & Plan notes found for this encounter.      Chencho Georges is a 35 y.o. male who is being evaluated via a billable telephone visit.      The patient has been notified of following:     \"This telephone visit will be conducted via a call between you and your physician/provider. We have found that certain health care needs can be provided without the need for a physical exam.  This service lets us provide the care you need with a short phone conversation.  If a prescription is necessary we can send it directly to your pharmacy.  If lab work is needed we can place an order for that and you can then stop by our lab to have the test done at a later time.    If during the course of the call the physician/provider feels a telephone visit is not appropriate, you will not be charged for this service.\"     Physician has received verbal consent for a Telephone Visit from the patient? Yes    Chencho Georges complains of  No chief complaint on file.      I have reviewed and updated the patient's Past Medical History, Social History, Family History and Medication List.    ALLERGIES  Patient has no known allergies.    Additional provider notes: insert own note template here  See note     Problem List Items Addressed This Visit     Exposure to COVID-19 virus - Primary     Super bad congestion  Saturday 1/9   Winded\"   [place]  [facility]  Mild   Cough Saturday 1/8  Taste No but congested   Smell No  HA    One instance \"winded\" with dishes     A/P       Stellara     Vitamin D3  Citalopram   Zyrtec    Pound Vitamin C   Zinc 25 mg     CHeck Covid19              Other Visit Diagnoses     Cough        Relevant Orders    Symptomatic COVID-19 Virus (CORONAVIRUS) PCR            Assessment/Plan:  1. Exposure to COVID-19 virus      2. Cough    - Symptomatic COVID-19 Virus (CORONAVIRUS) PCR; Future        Phone call duration:  16  minutes    Sergei Oliver MD    "

## 2021-06-15 NOTE — TELEPHONE ENCOUNTER
Sent [employer] Message confirming verification form for emotional support dog can be mailed to him. Form is completed by .   Jordon Koo CMA

## 2021-06-15 NOTE — TELEPHONE ENCOUNTER
Reason for Call:  Other call back      Detailed comments: PT RESPONDING TO DR TELLES'S PHONE MESSAGE, STATING EVERYTHING THAT HE SAID WAS ACCURATE   AND EMOTIONAL SUPPORT ANIMAL IS A DOG    Phone Number Patient can be reached at: Cell number on file:    Telephone Information:   Mobile [phone]       Best Time: ANYTIME    Can we leave a detailed message on this number?: Yes    Call taken on 2/25/2021 at 8:37 AM by Winifred oCe

## 2021-06-16 NOTE — TELEPHONE ENCOUNTER
Reason for Call:  Other call back      Detailed comments: Pt would like a call back from Dr. Oliver. He wants to talk to the doctor since he does not qualify for his covid shot through  [facility].   For his age group 16-44, he has to have 2 medical conditions. He only has immunocompromised.     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 He is on a waiting list for [facility].   Pt may need to check with other pharmacies like [facility] and [facility] to see if vaccine can be offered to him.    Phone Number Patient can be reached at:   Cell number on file:    Telephone Information:   Mobile [phone]       Best Time:     Can we leave a detailed message on this number?: Yes    Call taken on 3/23/2021 at 10:43 AM by Ana Crisostomo

## 2021-06-17 NOTE — TELEPHONE ENCOUNTER
RN cannot approve Refill Request    RN can NOT refill this medication historical medication requested. Last office visit: Visit date not found Last Physical: 9/24/2019 Last MTM visit: Visit date not found Last visit same specialty: Visit date not found.  Next visit within 3 mo: Visit date not found  Next physical within 3 mo: Visit date not found      Eleno Bean, [facility] Triage/Med Refill 5/5/2021    Requested Prescriptions   Pending Prescriptions Disp Refills     citalopram (CELEXA) 20 MG tablet  0     Sig: Take 1 tablet (20 mg total) by mouth every evening.       SSRI Refill Protocol  Passed - 5/4/2021  9:26 AM        Passed - PCP or prescribing provider visit in last year     Last office visit with prescriber/PCP: Visit date not found OR same dept: Visit date not found OR same specialty: Visit date not found  Last physical: 9/24/2019 Last MTM visit: Visit date not found   Next visit within 3 mo: Visit date not found  Next physical within 3 mo: Visit date not found  Prescriber OR PCP: Sergei Oliver MD  Last diagnosis associated with med order: There are no diagnoses linked to this encounter.  If protocol passes may refill for 12 months if within 3 months of last provider visit (or a total of 15 months).

## 2021-06-18 NOTE — LETTER
Letter by Sergei Oliver MD at      Author: Sergei Oliver MD Service: -- Author Type: --    Filed:  Encounter Date: 1/16/2019 Status: (Other)       Chencho WILTON [place]  [address]  [place] [address]             January 16, 2019         Dear Mr. Georges,    I would like you to add these supplements to your regimen:     Saccharomyces Boulardii  250 mg Three times daily   Therbiotic Complete From [employer]  3 daily   IN ADDITION TO OTHER THERAPIES FOR C DIFF.       Please call with questions or contact us using [employer].    Sincerely,        Electronically signed by Sergei Oliver MD

## 2021-06-19 NOTE — LETTER
Letter by Sergei Oliver MD at      Author: Sergei Oliver MD Service: -- Author Type: --    Filed:  Encounter Date: 6/18/2019 Status: (Other)         Chencho Georges  [address]  [place] [address]             June 18, 2019         Dear Mr. Georges,    Below are the results from your recent visit:    No results found from the [employer] message.    Chencho Johnson C Diff History     Saccharomyces Boulardii  250 mg Three times Daily    Broad spectrum probiotic lactobacillus bifidus species 30 to 50 billion units daily    Please call with questions or contact us using [employer].    Sincerely,        Electronically signed by Sergei Oliver MD

## 2021-06-19 NOTE — LETTER
Letter by Sergei Oliver MD at      Author: Sergei Oliver MD Service: -- Author Type: --    Filed:  Encounter Date: 8/23/2019 Status: (Other)         Chencho Georges  [address]  [facility] [address]             August 23, 2019         Dear Mr. Georges,    Below are the results from your recent visit:    Resulted Orders   XR Chest 2 Views    Narrative    EXAM: XR CHEST 2 VIEWS  LOCATION: [facility]  DATE/TIME: 8/22/2019 11:52 AM    INDICATION: Lower respiratory infection. Evaluate pneumonia.  COMPARISON: 12/22/2018.     FINDINGS: Negative chest. Large lung volumes. Lungs are clear.         Chest was OK Chencho.    This does not explain short of breath thing or the elevated D-dimer    Other labs look OK   WBC high due to Prednisone.    Follow   Do the Physician Elemental Diet if affordable  Do Bone Broth    I do have yvonne the Stool transplant will help immensely.  Always OK to seek other / second opinions    THe goal is to get you better.  DanL  Recent Results (from the past 240 hour(s))   Potassium   Result Value Ref Range    Potassium 4.6 3.5 - 5.0 mmol/L   Potassium   Result Value Ref Range    Potassium 3.7 3.5 - 5.0 mmol/L   Platelet Count - every other day x 3   Result Value Ref Range    Platelets 480 (H) 140 - 440 thou/uL   Creatinine   Result Value Ref Range    Creatinine 0.72 0.70 - 1.30 mg/dL    GFR MDRD Af Amer >60 >60 mL/min/1.73m2    GFR MDRD Non Af Amer >60 >60 mL/min/1.73m2   Electrocardiogram Perform and Read   Result Value Ref Range    SYSTOLIC BLOOD PRESSURE  mmHg    DIASTOLIC BLOOD PRESSURE  mmHg    VENTRICULAR RATE 104 BPM    ATRIAL RATE 104 BPM    P-R INTERVAL 116 ms    QRS DURATION 98 ms    Q-T INTERVAL 316 ms    QTC CALCULATION (BEZET) 415 ms    P Axis 49 degrees    R AXIS 75 degrees    T AXIS -16 degrees    MUSE DIAGNOSIS       Sinus tachycardia  Nonspecific ST and T wave abnormality  Abnormal ECG  When compared with ECG of 22-DEC-2018 04:24,  Sinus rhythm  has replaced Atrial fibrillation  Nonspecific T wave abnormality has replaced inverted T waves in Anterior leads  Confirmed by KADE EDWARDS MD LOC:[facility] ([id number]) on 8/22/2019 11:36:25 AM     D-dimer, Quantitative   Result Value Ref Range    D-Dimer, Quant 0.88 (H) <=0.50 FEU ug/mL   Morphology, Path Smear Review (MORP)   Result Value Ref Range    Pathology, Smear Review See Separate Pathology Report (!) (none)    WBC 15.3 (H) 4.0 - 11.0 thou/uL    RBC 5.33 4.40 - 6.20 mill/uL    Hemoglobin 13.7 (L) 14.0 - 18.0 g/dL    Hematocrit 44.4 40.0 - 54.0 %    MCV 83 80 - 100 fL    MCH 25.7 (L) 27.0 - 34.0 pg    MCHC 30.9 (L) 32.0 - 36.0 g/dL    RDW 14.4 11.0 - 14.5 %    Platelets 507 (H) 140 - 440 thou/uL    MPV 9.9 8.5 - 12.5 fL    Neutrophils % 84 (H) 50 - 70 %    Lymphocytes % 7 (L) 20 - 40 %    Monocytes % 8 2 - 10 %    Eosinophils % 0 0 - 6 %    Basophils % 0 0 - 2 %    Neutrophils Absolute 12.9 (H) 2.0 - 7.7 thou/uL    Lymphocytes Absolute 1.1 0.8 - 4.4 thou/uL    Monocytes Absolute 1.3 (H) 0.0 - 0.9 thou/uL    Eosinophils Absolute 0.0 0.0 - 0.4 thou/uL    Basophils Absolute 0.0 0.0 - 0.2 thou/uL   Comprehensive Metabolic Panel   Result Value Ref Range    Sodium 140 136 - 145 mmol/L    Potassium 4.7 3.5 - 5.0 mmol/L    Chloride 94 (L) 98 - 107 mmol/L    CO2 31 22 - 31 mmol/L    Anion Gap, Calculation 15 5 - 18 mmol/L    Glucose 102 70 - 125 mg/dL    BUN 10 8 - 22 mg/dL    Creatinine 0.82 0.70 - 1.30 mg/dL    GFR MDRD Af Amer >60 >60 mL/min/1.73m2    GFR MDRD Non Af Amer >60 >60 mL/min/1.73m2    Bilirubin, Total 0.4 0.0 - 1.0 mg/dL    Calcium 9.8 8.5 - 10.5 mg/dL    Protein, Total 7.4 6.0 - 8.0 g/dL    Albumin 3.5 3.5 - 5.0 g/dL    Alkaline Phosphatase 79 45 - 120 U/L    AST 8 0 - 40 U/L    ALT 11 0 - 45 U/L   Procalcitonin   Result Value Ref Range    Procalcitonin 0.07 0.00 - 0.49 ng/mL       Please call with questions or contact us using [employer].    Sincerely,        Electronically signed by Sergei Oliver MD

## 2021-06-21 NOTE — LETTER
Letter by Sergei Oliver MD at      Author: Sergei Oliver MD Service: -- Author Type: --    Filed:  Encounter Date: 1/26/2021 Status: (Other)         January 26, 2021     Patient: Chencho Georges   YOB: 1985   Date of Visit: 1/26/2021       To Whom It May Concern:    It is my medical opinion that Chencho Georges has a pet dog, which functions as an emotional support animal.      If you have any questions or concerns, please don't hesitate to call.    Sincerely,        Electronically signed by Sergei Oliver MD

## 2021-06-21 NOTE — LETTER
Letter by Sergei Oliver MD at      Author: Sergei Oliver MD Service: -- Author Type: --    Filed:  Encounter Date: 1/15/2021 Status: (Other)         January 15, 2021     Patient: Chencho Georges   YOB: 1985   Date of Visit: 1/15/2021       To Whom It May Concern:    It is my medical opinion that Chencho Georges should remain out of work until until January 25. 2021.  He was diagnosed with Covid-19 on 1/12/2021.  If he is clinically improved and symptom free, he may return to work on the above date.  If he is still symptomatic from this illness, the return to work date may need to be pushed back and this return to work will be amended.      If you have any questions or concerns, please don't hesitate to call.    Sincerely,        Electronically signed by Sergei Oliver MD

## 2021-06-22 NOTE — ANESTHESIA POSTPROCEDURE EVALUATION
Patient: Chencho Georges  EXPLORATORY LAPAROTOMY ILEO COLIC RESECTION, TAKE DOWN SMALL BOWEL FISTULA  Anesthesia type: general    Patient location: PACU  Last vitals:   Vitals:    12/21/18 1045   BP: 145/90   Pulse: 76   Resp: 12   Temp:    SpO2: 100%     Post vital signs: stable  Level of consciousness: awake and responds to simple questions  Post-anesthesia pain: pain controlled  Post-anesthesia nausea and vomiting: no  Pulmonary: unassisted, return to baseline  Cardiovascular: stable and blood pressure at baseline  Hydration: adequate  Anesthetic events: no    QCDR Measures:  ASA# 11 - Kori-op Cardiac Arrest: ASA11B - Patient did NOT experience unanticipated cardiac arrest  ASA# 12 - Kori-op Mortality Rate: ASA12B - Patient did NOT die  ASA# 13 - PACU Re-Intubation Rate: ASA13B - Patient did NOT require a new airway mgmt  ASA# 10 - Composite Anes Safety: ASA10A - No serious adverse event    Additional Notes:

## 2021-06-22 NOTE — ANESTHESIA CARE TRANSFER NOTE
Last vitals:   Vitals:    12/21/18 1001   BP: (!) 147/101   Pulse: 69   Resp: 13   Temp: 36.9  C (98.4  F)   SpO2: 100%     Patient's level of consciousness is drowsy  Spontaneous respirations: yes  Maintains airway independently: yes  Dentition unchanged: yes  Oropharynx: oropharynx clear of all foreign objects    QCDR Measures:  ASA# 20 - Surgical Safety Checklist: WHO surgical safety checklist completed prior to induction    PQRS# 430 - Adult PONV Prevention: 4558F - Pt received => 2 anti-emetic agents (different classes) preop & intraop  ASA# 8 - Peds PONV Prevention: NA - Not pediatric patient, not GA or 2 or more risk factors NOT present  PQRS# 424 - Kori-op Temp Management: 4559F - At least one body temp DOCUMENTED => 35.5C or 95.9F within required timeframe  PQRS# 426 - PACU Transfer Protocol: - Transfer of care checklist used  ASA# 14 - Acute Post-op Pain: ASA14B - Patient did NOT experience pain >= 7 out of 10

## 2021-06-22 NOTE — ANESTHESIA PREPROCEDURE EVALUATION
Anesthesia Evaluation      Patient summary reviewed   No history of anesthetic complications     Airway   Mallampati: II  Neck ROM: full   Pulmonary     breath sounds clear to auscultation  (+) a smoker  (-) pneumonia, COPD, asthma, shortness of breath, recent URI, sleep apnea, rhonchi, wheezes, rales, stridor                         Cardiovascular   Exercise tolerance: > or = 4 METS  (-) hypertension, past MI, CAD, murmur  Rhythm: regular  Rate: normal,    no murmur      Neuro/Psych    (-) no seizures, no CVA    Endo/Other    (-) no diabetes, hypothyroidism, arthritis     GI/Hepatic/Renal    (-) GERD, impaired hepatic function, renal disease    Comments: Crohn's     Other findings:   Hgb 13.6      Dental - normal exam     Comment: No loose, chipped, partial, removable or dentures.                         Anesthesia Plan  Planned anesthetic: general endotracheal and peripheral nerve block  GETA..  Dexamethasone and zofran for PONV ppx.  Discussed potential bilateral TAP blocks for postoperative analgesia pending surgeon preference.  Addendum:  Surgeon prefers TAP blocks.  ASA 2   Induction: intravenous   Anesthetic plan and risks discussed with: patient  Anesthesia plan special considerations: increased risk of difficult airway,   Post-op plan: routine recovery

## 2021-06-22 NOTE — ANESTHESIA PROCEDURE NOTES
Peripheral Block    Patient location during procedure: OR  Start time: 12/21/2018 9:48 AM  End time: 12/21/2018 9:54 AM  post-op analgesia per surgeon order as noted in medical record  Staffing:  Performing  Anesthesiologist: Thiago Camacho MD  Preanesthetic Checklist  Completed: patient identified, site marked, risks, benefits, and alternatives discussed, timeout performed, consent obtained, airway assessed, oxygen available, suction available, emergency drugs available and hand hygiene performed  Peripheral Block  Block type: other, TAP  Prep: ChloraPrep  Patient position: supine  Patient monitoring: cardiac monitor, continuous pulse oximetry, heart rate and blood pressure  Laterality: bilateral  Injection technique: ultrasound guided    Ultrasound used to visualize needle placement in proximity to nerve being blocked: yes   Permanent ultrasound image captured for medical record  Sterile gel and probe cover used for ultrasound.    Needle  Needle type: echogenic   Needle gauge: 22 G  Needle length: 6 in  no peripheral nerve catheter placed  Assessment  Injection assessment: no difficulty with injection, negative aspiration for heme, no paresthesia on injection and incremental injection  Additional Notes  20ml 0.25% Marcaine w/ epinephrine 1:200K injected bilaterally.

## 2021-06-22 NOTE — PROGRESS NOTES
Preoperative Exam    Scheduled Procedure: colorectal   Surgery Date: 12/21/18    Surgery Location: [facility], fax [phone]    Surgeon:  Dr. Koo     Assessment/Plan:     1. Crohn's disease with fistula, unspecified gastrointestinal tract location (H)       2. Preop general physical exam     - Electrocardiogram Perform - Clinic  - 2(CBC w/o Differential)  - Glycosylated Hemoglobin A1c  - Lipid Cascade  - Potassium     Surgical Procedure Risk: Intermediate (reported cardiac risk generally 1-5%)  Have you had prior anesthesia?: Yes  Have you or any family members had a previous anesthesia reaction:  No  Do you or any family members have a history of a clotting or bleeding disorder?: No  Cardiac Risk Assessment: no increased risk for major cardiac complications    Patient approved for surgery with general or local anesthesia.    ECG done, NSR.  Increased r/s ratio - no other evidence or history of ischemia, therefore, likely a normal variant.    Functional Status: Independent  Patient plans to recover at home with family.     Subjective:      Chencho Georges is a 33 y.o. male who presents for a preoperative consultation.  Needs colon fistula removal. Asymptomatic.    All other systems reviewed and are negative, other than those listed in the HPI.    Pertinent History  Do you have difficulty breathing or chest pain after walking up a flight of stairs: No  History of obstructive sleep apnea: No  Steroid use in the last 6 months: No  Frequent Aspirin/NSAID use: No  Prior Blood Transfusion: No  Prior Blood Transfusion Reaction: No  If for some reason prior to, during or after the procedure, if it is medically indicated, would you be willing to have a blood transfusion?:  There is no transfusion refusal.    Able to walk 4 blocks or climb 2 flights of stairs: Yes  Use alcohol: no  Use tobacco: no  Use illicit drugs: no  History of kidney or liver disease: no  History of  MI or angina: no  History of  irregular heartbeat: no  History of CVA: no  History of epilepsy: no  History of CHF: no  History of asthma or COPD: no  History Diabetes: no    Last tetanus:2011    ______________________________________________________________________    STOPBANG SHRUTHI ASSESSMENT    1.  Do you snore loudly (louder than talking or loud enough to be heard through closed doors):  no    2.  Do you often feel tired, fatigued, or sleepy during the day:  no    3.  Has anyone observed you stop breathing during sleep:  no    4.  Do you have or are you being treated for high blood pressure:  no    5.  Body mass index > 35:  Body mass index is 29.63 kg/m .    6.  Age > 50:  33 y.o.     7.  Neck circumference greater than 40 cm:  y    8.  Gender male:  male     Total score:  2    A score of 3 or greater indicates a risk for sleep apnea (84% sensitivity).  A score of 5 or greater is more predictive of clinically relevant moderate to severe obstructive sleep apnea.    ______________________________________________________________________        Current Outpatient Medications   Medication Sig Dispense Refill     adalimumab (HUMIRA PEN) 40 mg/0.4 mL PnKt Inject one pen every other week starting after induction dosing completed.       cholecalciferol, vitamin D3, 1,000 unit tablet Take 1,000 Units by mouth.       DOCOSAHEXANOIC ACID ORAL Take 1 capsule by mouth.       omega 3-dha-epa-fish oil (FISH OIL) 100-160-1,000 mg cap Take by mouth.       ondansetron (ZOFRAN-ODT) 4 MG disintegrating tablet Place 4 mg under the tongue.       riboflavin, vitamin B2, 25 mg Tab        adalimumab (HUMIRA PEN CROHN'S-[facility]-HS START) 40 mg/0.8 mL PnKt inject 160 mg day 1, then 80 mg day 15 by subcutaneous route       No current facility-administered medications for this visit.         No Known Allergies    Patient Active Problem List   Diagnosis     Fever Of Unknown Origin     Crohn's Disease     Nontropical (Celiac) Sprue     Cellulitis Of The Leg     Skin Symptoms      Low vitamin B12 level       No past medical history on file.    No past surgical history on file.    Social History     Socioeconomic History     Marital status: Single     Spouse name: Not on file     Number of children: Not on file     Years of education: Not on file     Highest education level: Not on file   Social Needs     Financial resource strain: Not on file     Food insecurity - worry: Not on file     Food insecurity - inability: Not on file     Transportation needs - medical: Not on file     Transportation needs - non-medical: Not on file   Occupational History     Not on file   Tobacco Use     Smoking status: Former Smoker     Last attempt to quit: 7/8/2015     Years since quitting: 3.4     Smokeless tobacco: Former User   Substance and Sexual Activity     Alcohol use: Not on file     Drug use: Not on file     Sexual activity: Not on file   Other Topics Concern     Not on file   Social History Narrative     Not on file             Objective:     Vitals:    12/14/18 1018   BP: 122/66   Pulse: 94   Resp: 16   SpO2: 99%   Weight: 206 lb 8 oz (93.7 kg)   PainSc: 0-No pain         Physical Exam:  Gen: NAD, conversant, appears age, well-kempt  Skin: warm, dry, no rash, pallor cyanosis  HENT: normocephalic atraumatic, MMM, no oral lesions, otorrhea, rhinorrhea. TM's normal bilaterally.  Eyes: non-icteric, extra-ocular movements intact, PERRL, conjunctivae not injected. Holding eyes open comfortably, no drainage.  CV: NRRR no m/r/g, no peripheral edema. no JVD.  Resp: CTAB no w/r/r, normal respiratory effort  GI: soft, non-tender, non-distended. No masses.  MSK: no muscle or joint swelling.  Neuro: no dysarthria or gross asymmetry  Psych: full affect, oriented x 3  Lymph: No significant cervical lymphadenopathy  Hematologic: No petechiae or purpura.      There are no Patient Instructions on file for this visit.        Labs:      Immunization History   Administered Date(s) Administered     DT (pediatric)  05/27/1999     Hep B, historic 05/27/1999, 07/06/1999, 01/28/2000     Influenza,seasonal quad, PF, 36+MOS 12/15/2016     Td,adult,historic,unspecified 05/27/1999     Tdap 02/24/2011     Varicella 07/06/1999           Electronically signed by Jose Angel Han MD 12/14/18 10:26 AM

## 2021-06-23 NOTE — TELEPHONE ENCOUNTER
----- Message from Sergei Oliver MD sent at 1/15/2019  5:54 PM CST -----  Call Chencho    Have him add to his regimen :    Saccharomyces Boulardii  250 mg Three times daily  Therbiotic Complete From [employer]  3 daily   IN ADDITION TO OTHER THERAPIES FOR C DIFF.    DanL

## 2021-07-19 ENCOUNTER — TRANSFERRED RECORDS (OUTPATIENT)
Dept: HEALTH INFORMATION MANAGEMENT | Facility: CLINIC | Age: 36
End: 2021-07-19

## 2021-08-03 PROBLEM — R65.10 SIRS (SYSTEMIC INFLAMMATORY RESPONSE SYNDROME) (H): Status: RESOLVED | Noted: 2019-06-19 | Resolved: 2019-09-24

## 2021-08-03 PROBLEM — A41.9 SEPSIS (H): Status: RESOLVED | Noted: 2019-08-08 | Resolved: 2019-09-24

## 2021-08-15 ENCOUNTER — HEALTH MAINTENANCE LETTER (OUTPATIENT)
Age: 36
End: 2021-08-15

## 2021-10-10 ENCOUNTER — HEALTH MAINTENANCE LETTER (OUTPATIENT)
Age: 36
End: 2021-10-10

## 2021-10-25 ENCOUNTER — TRANSFERRED RECORDS (OUTPATIENT)
Dept: HEALTH INFORMATION MANAGEMENT | Facility: CLINIC | Age: 36
End: 2021-10-25
Payer: COMMERCIAL

## 2022-04-12 DIAGNOSIS — F32.A DEPRESSION, UNSPECIFIED DEPRESSION TYPE: Primary | ICD-10-CM

## 2022-04-12 RX ORDER — BUPROPION HYDROCHLORIDE 150 MG/1
150 TABLET ORAL EVERY MORNING
Qty: 90 TABLET | Refills: 0 | Status: SHIPPED | OUTPATIENT
Start: 2022-04-12 | End: 2022-07-12

## 2022-07-10 DIAGNOSIS — F32.A DEPRESSION, UNSPECIFIED DEPRESSION TYPE: ICD-10-CM

## 2022-07-12 RX ORDER — BUPROPION HYDROCHLORIDE 150 MG/1
150 TABLET ORAL EVERY MORNING
Qty: 90 TABLET | Refills: 0 | Status: SHIPPED | OUTPATIENT
Start: 2022-07-12

## 2022-09-18 ENCOUNTER — HEALTH MAINTENANCE LETTER (OUTPATIENT)
Age: 37
End: 2022-09-18

## 2023-10-08 ENCOUNTER — HEALTH MAINTENANCE LETTER (OUTPATIENT)
Age: 38
End: 2023-10-08